# Patient Record
Sex: FEMALE | Race: WHITE | Employment: UNEMPLOYED | URBAN - METROPOLITAN AREA
[De-identification: names, ages, dates, MRNs, and addresses within clinical notes are randomized per-mention and may not be internally consistent; named-entity substitution may affect disease eponyms.]

---

## 2017-05-05 ENCOUNTER — TELEPHONE (OUTPATIENT)
Dept: FAMILY MEDICINE CLINIC | Facility: CLINIC | Age: 52
End: 2017-05-05

## 2017-05-05 DIAGNOSIS — Z12.39 BREAST CANCER SCREENING: Primary | ICD-10-CM

## 2017-05-05 NOTE — TELEPHONE ENCOUNTER
See below. Pt had breast MRI 3/2016, was done early I think though. Her u/s after was fine and said routine f/u was recommended. No time frame was mentioned. I am not sure what prompted her order for diagnostic mammo 11/29.  See notes from original [address] [place] [address] Loop

## 2017-05-05 NOTE — TELEPHONE ENCOUNTER
Pt called stating she needed a mammo order. She had her last mammo last year in March. She stated they found something, and was told to come back in a year. She has an order in from (11/29/16), but she would like to know if this is the correct one.  Please

## 2017-05-08 RX ORDER — NITROFURANTOIN 25; 75 MG/1; MG/1
CAPSULE ORAL
Qty: 30 CAPSULE | Refills: 0 | Status: SHIPPED | OUTPATIENT
Start: 2017-05-08 | End: 2019-04-08

## 2017-05-08 RX ORDER — NITROFURANTOIN 25; 75 MG/1; MG/1
CAPSULE ORAL
Qty: 90 CAPSULE | Refills: 0 | Status: SHIPPED | OUTPATIENT
Start: 2017-05-08 | End: 2018-01-08

## 2017-05-08 NOTE — TELEPHONE ENCOUNTER
Call to pt's cell reaches identified voice mail. Per hipaa consent, left vmm informing pako MEDRANO placed new requested order 5/5/17-can call [facility] scheduling to schedule. Advised to call back if any further questions.

## 2017-05-11 ENCOUNTER — HOSPITAL ENCOUNTER (OUTPATIENT)
Dept: MAMMOGRAPHY | Facility: HOSPITAL | Age: 52
Discharge: HOME | End: 2017-05-11
Attending: FAMILY MEDICINE
Payer: COMMERCIAL

## 2017-05-11 DIAGNOSIS — Z12.39 BREAST CANCER SCREENING: ICD-10-CM

## 2017-05-11 PROCEDURE — 77063 BREAST TOMOSYNTHESIS BI: CPT | Performed by: FAMILY MEDICINE

## 2017-05-11 PROCEDURE — 77067 SCR MAMMO BI INCL CAD: CPT | Performed by: FAMILY MEDICINE

## 2017-11-16 RX ORDER — DROSPIRENONE AND ETHINYL ESTRADIOL 0.03MG-3MG
KIT ORAL
Qty: 28 TABLET | Refills: 0 | Status: SHIPPED | OUTPATIENT
Start: 2017-11-16 | End: 2017-12-11

## 2017-12-11 RX ORDER — DROSPIRENONE AND ETHINYL ESTRADIOL 0.03MG-3MG
KIT ORAL
Qty: 28 TABLET | Refills: 0 | Status: SHIPPED | OUTPATIENT
Start: 2017-12-11 | End: 2018-01-08

## 2018-01-08 ENCOUNTER — OFFICE VISIT (OUTPATIENT)
Dept: FAMILY MEDICINE CLINIC | Facility: CLINIC | Age: 53
End: 2018-01-08

## 2018-01-08 VITALS
HEART RATE: 76 BPM | HEIGHT: 66 IN | BODY MASS INDEX: 24.27 KG/M2 | DIASTOLIC BLOOD PRESSURE: 80 MMHG | WEIGHT: 151 LBS | TEMPERATURE: 97 F | RESPIRATION RATE: 12 BRPM | SYSTOLIC BLOOD PRESSURE: 120 MMHG

## 2018-01-08 DIAGNOSIS — Z12.31 ENCOUNTER FOR SCREENING MAMMOGRAM FOR MALIGNANT NEOPLASM OF BREAST: ICD-10-CM

## 2018-01-08 DIAGNOSIS — Z13.89 SCREENING FOR GENITOURINARY CONDITION: ICD-10-CM

## 2018-01-08 DIAGNOSIS — Z11.3 SCREENING FOR STD (SEXUALLY TRANSMITTED DISEASE): ICD-10-CM

## 2018-01-08 DIAGNOSIS — Z12.11 ENCOUNTER FOR SCREENING COLONOSCOPY: ICD-10-CM

## 2018-01-08 DIAGNOSIS — Z11.51 SCREENING FOR HPV (HUMAN PAPILLOMAVIRUS): ICD-10-CM

## 2018-01-08 DIAGNOSIS — Z00.00 ROUTINE GENERAL MEDICAL EXAMINATION AT A HEALTH CARE FACILITY: Primary | ICD-10-CM

## 2018-01-08 DIAGNOSIS — N76.1 SUBACUTE VAGINITIS: ICD-10-CM

## 2018-01-08 DIAGNOSIS — Z00.00 BLOOD TESTS FOR ROUTINE GENERAL PHYSICAL EXAMINATION: ICD-10-CM

## 2018-01-08 DIAGNOSIS — Z13.820 ENCOUNTER FOR SCREENING FOR OSTEOPOROSIS: ICD-10-CM

## 2018-01-08 DIAGNOSIS — Z12.4 PAP SMEAR FOR CERVICAL CANCER SCREENING: ICD-10-CM

## 2018-01-08 PROCEDURE — 87660 TRICHOMONAS VAGIN DIR PROBE: CPT | Performed by: FAMILY MEDICINE

## 2018-01-08 PROCEDURE — 87510 GARDNER VAG DNA DIR PROBE: CPT | Performed by: FAMILY MEDICINE

## 2018-01-08 PROCEDURE — 87491 CHLMYD TRACH DNA AMP PROBE: CPT | Performed by: FAMILY MEDICINE

## 2018-01-08 PROCEDURE — 87624 HPV HI-RISK TYP POOLED RSLT: CPT | Performed by: FAMILY MEDICINE

## 2018-01-08 PROCEDURE — 88175 CYTOPATH C/V AUTO FLUID REDO: CPT | Performed by: FAMILY MEDICINE

## 2018-01-08 PROCEDURE — 87480 CANDIDA DNA DIR PROBE: CPT | Performed by: FAMILY MEDICINE

## 2018-01-08 PROCEDURE — 87591 N.GONORRHOEAE DNA AMP PROB: CPT | Performed by: FAMILY MEDICINE

## 2018-01-08 PROCEDURE — 99396 PREV VISIT EST AGE 40-64: CPT | Performed by: FAMILY MEDICINE

## 2018-01-08 RX ORDER — DROSPIRENONE AND ETHINYL ESTRADIOL 0.03MG-3MG
1 KIT ORAL
Qty: 3 PACKAGE | Refills: 3 | Status: SHIPPED | OUTPATIENT
Start: 2018-01-08 | End: 2018-11-20

## 2018-01-08 RX ORDER — AZITHROMYCIN 250 MG/1
TABLET, FILM COATED ORAL
Qty: 6 TABLET | Refills: 0 | Status: SHIPPED | OUTPATIENT
Start: 2018-01-08 | End: 2018-02-16 | Stop reason: ALTCHOICE

## 2018-01-08 NOTE — PROGRESS NOTES
CHIEF COMPLAINT       Annual Physical Exam  HPI:   Jayy Young is a 50year old female who presents for a complete physical exam.   Off and on problems with vaginal discharge. Sometimes with odor. Cough for about 2 weeks. Would like z pack.    Wt Marco Salm OR) Take  by mouth.  Disp:  Rfl:    fluconazole (DIFLUCAN) 150 MG Oral Tab 1 po once Disp: 1 tablet Rfl: 0      No Known Allergies   Past Medical History:   Diagnosis Date   • Anxiety state, unspecified    • Chest pain, unspecified 12/09   •  apparent distress  SKIN: no rashes,no suspicious lesions  HEENT: atraumatic, normocephalic,ears and throat are clear  EYES:PERRLA, EOMI, normal nondilated fundoscopic exam,conjunctiva are clear  NECK: supple,no adenopathy,no bruits  CHEST: no chest tendern

## 2018-01-09 LAB
C TRACH DNA SPEC QL NAA+PROBE: NEGATIVE
N GONORRHOEA DNA SPEC QL NAA+PROBE: NEGATIVE

## 2018-01-10 LAB — HPV I/H RISK 1 DNA SPEC QL NAA+PROBE: NEGATIVE

## 2018-01-11 RX ORDER — FLUCONAZOLE 150 MG/1
TABLET ORAL
Qty: 1 TABLET | Refills: 0 | Status: SHIPPED | OUTPATIENT
Start: 2018-01-11 | End: 2018-02-16 | Stop reason: ALTCHOICE

## 2018-01-11 RX ORDER — METRONIDAZOLE 500 MG/1
500 TABLET ORAL 2 TIMES DAILY
Qty: 14 TABLET | Refills: 0 | Status: SHIPPED | OUTPATIENT
Start: 2018-01-11 | End: 2018-02-16 | Stop reason: ALTCHOICE

## 2018-01-11 NOTE — TELEPHONE ENCOUNTER
Please see pended refill of Diflucan and Metronidazole and approve if appropriate. Per pt's request for Diflucan refill. She states that she will get yeast infection after the treatment and would like to have just in case. Thanks.

## 2018-01-15 ENCOUNTER — TELEPHONE (OUTPATIENT)
Dept: FAMILY MEDICINE CLINIC | Facility: CLINIC | Age: 53
End: 2018-01-15

## 2018-01-15 RX ORDER — CLINDAMYCIN PHOSPHATE 20 MG/G
CREAM VAGINAL
Qty: 40 G | Refills: 0 | Status: SHIPPED | OUTPATIENT
Start: 2018-01-15 | End: 2018-02-16 | Stop reason: ALTCHOICE

## 2018-01-15 NOTE — TELEPHONE ENCOUNTER
Stomach upset would be typical with the metronidazole. The other symptoms wouldn't be typical.  She only has 7 days of med. Would she like to continue it or sub something else?  Patients tell me the vaginal preparations for bacterial vaginosis are more exp

## 2018-01-15 NOTE — TELEPHONE ENCOUNTER
Pt calling she thinks she having a reaction to the meds for 1/11   metRONIDAZOLE 500 MG Oral Tab back pain, still bleeding from period and very heavy should be over by now and a headache only took 3 pills so far transfered to triage

## 2018-01-23 ENCOUNTER — TELEPHONE (OUTPATIENT)
Dept: FAMILY MEDICINE CLINIC | Facility: CLINIC | Age: 53
End: 2018-01-23

## 2018-01-23 DIAGNOSIS — Z00.00 BLOOD TESTS FOR ROUTINE GENERAL PHYSICAL EXAMINATION: Primary | ICD-10-CM

## 2018-01-23 DIAGNOSIS — Z13.89 SCREENING FOR GENITOURINARY CONDITION: ICD-10-CM

## 2018-01-23 NOTE — TELEPHONE ENCOUNTER
Patient states that her insurance will only pay for [facility].  Updated chart. Please send all labs to [facility].  Thank you.

## 2018-01-23 NOTE — TELEPHONE ENCOUNTER
Record shows ofc visit/cpx with pako MEDRANO with routine [facility] lab orders placed same date. Due to info noted below same lab orders redone for [facility] and placed.

## 2018-02-16 ENCOUNTER — OFFICE VISIT (OUTPATIENT)
Dept: FAMILY MEDICINE CLINIC | Facility: CLINIC | Age: 53
End: 2018-02-16

## 2018-02-16 ENCOUNTER — TELEPHONE (OUTPATIENT)
Dept: FAMILY MEDICINE CLINIC | Facility: CLINIC | Age: 53
End: 2018-02-16

## 2018-02-16 VITALS
TEMPERATURE: 98 F | WEIGHT: 149 LBS | DIASTOLIC BLOOD PRESSURE: 82 MMHG | BODY MASS INDEX: 24 KG/M2 | HEART RATE: 80 BPM | RESPIRATION RATE: 12 BRPM | SYSTOLIC BLOOD PRESSURE: 112 MMHG

## 2018-02-16 DIAGNOSIS — R31.9 HEMATURIA, UNSPECIFIED TYPE: ICD-10-CM

## 2018-02-16 DIAGNOSIS — M54.50 ACUTE LOW BACK PAIN WITHOUT SCIATICA, UNSPECIFIED BACK PAIN LATERALITY: Primary | ICD-10-CM

## 2018-02-16 LAB
APPEARANCE: CLEAR
MULTISTIX LOT#: ABNORMAL NUMERIC
PH, URINE: 6 (ref 4.5–8)
SPECIFIC GRAVITY: 1 (ref 1–1.03)
URINE-COLOR: YELLOW
UROBILINOGEN,SEMI-QN: 0.2 MG/DL (ref 0–1.9)

## 2018-02-16 PROCEDURE — 99213 OFFICE O/P EST LOW 20 MIN: CPT | Performed by: FAMILY MEDICINE

## 2018-02-16 PROCEDURE — 81003 URINALYSIS AUTO W/O SCOPE: CPT | Performed by: FAMILY MEDICINE

## 2018-02-16 NOTE — TELEPHONE ENCOUNTER
Patient has had severe back pain for one week. Pain comes and goes and radiates to kidneys. Patient is currently on antibiotics after intercourse for bladder infections.

## 2018-02-16 NOTE — TELEPHONE ENCOUNTER
REINA To pt. She was notified her appt for her Stat CT of the abdomen and pelvis kidney stone protocol is today at 4:00 PM. She is to go to the [facility] at [address] [facility]. Pt was advised to fast 2 hours prior. Nothing to eat or drink.  She is to ta

## 2018-02-16 NOTE — PROGRESS NOTES
Uyen Najera is a 50year old female. HPI:   The patient complains of a back discomfort which can radiate forward for the past 2 weeks. She states the pain can be fairly severe when it happens. She states the pain is intermittent.   She denies any spe glasses wine every other day       REVIEW OF SYSTEMS:   GENERAL HEALTH: feels well otherwise  SKIN: denies any unusual skin lesions or rashes  RESPIRATORY: denies shortness of breath with exertion  CARDIOVASCULAR: denies chest pain on exertion  GI: See HPI

## 2018-02-18 ENCOUNTER — TELEPHONE (OUTPATIENT)
Dept: FAMILY MEDICINE CLINIC | Facility: CLINIC | Age: 53
End: 2018-02-18

## 2018-02-19 NOTE — TELEPHONE ENCOUNTER
Called to pt. Pt stated that she did not get the Ct done due to the $1000 out of pocket cost of the test.  Pt stated that she wanted to know what Dr White Ours thought the pain was from. Advised pt that provider ordered test to  R/o kidney stones.   Pt voi

## 2018-02-19 NOTE — TELEPHONE ENCOUNTER
We had ordered a stat CT of the abdomen and pelvis to rule out kidney stone. The patient did not follow through on this. How is the patient feeling?

## 2018-02-20 NOTE — PROGRESS NOTES
Claudette Wheeler is a 50year old female. CHIEF COMPLAINT   Mood changes, questions about menopause  HPI:   Mood changes recently. More anxiety recently - wakes up with anxiety. Motivation decreased. Not as organized as she was before.   Doesn't want to Alcohol use: Yes           0.0 oz/week     Comment: occas/ couples glasses wine every other day       REVIEW OF SYSTEMS:   GENERAL HEALTH: as above  SKIN: denies any unusual skin lesions or rashes  RESPIRATORY: denies shortness of breath with exertion  C

## 2018-02-21 ENCOUNTER — TELEPHONE (OUTPATIENT)
Dept: FAMILY MEDICINE CLINIC | Facility: CLINIC | Age: 53
End: 2018-02-21

## 2018-02-21 NOTE — TELEPHONE ENCOUNTER
I don't prescribe abx without seeing patient and collecting urine cx first.  She needs to go to immediate care or [address] or be seen in office tomorrow.

## 2018-02-21 NOTE — TELEPHONE ENCOUNTER
Call to pt-confirms onset drung the night of pain with urinating. sts had urinary frequency for past 2 wks and at time she saw dr Chas Anders last wk but no pain or other symptoms at that time.   sts takes macrobid \"which seems to keep things at [facility] [address] most of

## 2018-02-21 NOTE — TELEPHONE ENCOUNTER
Call to pt-advised of pako MEDRANO comments/recommendations noted below. Pt insists she can not and will not go to [facility] or lab for further testing. \"only want abx ordered\"  Advised will forward info to dr Mansoor Canchola now.  Will call back either with recomme

## 2018-02-21 NOTE — TELEPHONE ENCOUNTER
Dicussed by phone with dr Shana Lacey comments/recommendations are as follows:    \"Pt seen in ofc 2/16/18 reporting 2 wk hx of back discomfort radiating forward, hematuria and known hx of frequent UTI's.    Pt did not follow through with orders for stat

## 2018-02-21 NOTE — TELEPHONE ENCOUNTER
Dr. Annemarie Fabry saw last week (I saw her for unrelated issue). I would advise being seen at [facility] (won't have to take off work since they are open later) so that they can check urine and do a urine culture.   If she declines, check urinalysis and urine

## 2018-02-21 NOTE — TELEPHONE ENCOUNTER
Pt calling she was in yesterday for different appt but was here Friday with Dr. Ramesh Frias and did a urine sample and was ok'ed, however today she is having a lot of pain when using the bathroom.  She would like some antibiotics she does take a med after intercourse

## 2018-02-22 RX ORDER — CIPROFLOXACIN 250 MG/1
TABLET, FILM COATED ORAL
Qty: 10 TABLET | Refills: 0 | OUTPATIENT
Start: 2018-02-22 | End: 2018-03-20 | Stop reason: ALTCHOICE

## 2018-03-02 ENCOUNTER — HOSPITAL ENCOUNTER (OUTPATIENT)
Dept: CT IMAGING | Facility: HOSPITAL | Age: 53
Discharge: HOME | End: 2018-03-02
Attending: FAMILY MEDICINE
Payer: COMMERCIAL

## 2018-03-02 DIAGNOSIS — R79.9 ABNORMAL BLOOD CHEMISTRY: Primary | ICD-10-CM

## 2018-03-02 DIAGNOSIS — R31.9 HEMATURIA, UNSPECIFIED TYPE: ICD-10-CM

## 2018-03-02 DIAGNOSIS — M54.50 ACUTE LOW BACK PAIN WITHOUT SCIATICA, UNSPECIFIED BACK PAIN LATERALITY: ICD-10-CM

## 2018-03-02 LAB
ABSOLUTE BASOPHILS: 65 CELLS/UL (ref 0–200)
ABSOLUTE EOSINOPHILS: 283 CELLS/UL (ref 15–500)
ABSOLUTE LYMPHOCYTES: 2322 CELLS/UL (ref 850–3900)
ABSOLUTE MONOCYTES: 512 CELLS/UL (ref 200–950)
ABSOLUTE NEUTROPHILS: 7717 CELLS/UL (ref 1500–7800)
ALBUMIN/GLOBULIN RATIO: 1.5 (CALC) (ref 1–2.5)
ALBUMIN: 4.2 G/DL (ref 3.6–5.1)
ALKALINE PHOSPHATASE: 32 U/L (ref 33–130)
ALT: 10 U/L (ref 6–29)
AST: 15 U/L (ref 10–35)
BASOPHILS: 0.6 %
BILIRUBIN, TOTAL: 0.6 MG/DL (ref 0.2–1.2)
BILIRUBIN: NEGATIVE
BUN: 14 MG/DL (ref 7–25)
CALCIUM: 9.2 MG/DL (ref 8.6–10.4)
CARBON DIOXIDE: 23 MMOL/L (ref 20–31)
CHLORIDE: 106 MMOL/L (ref 98–110)
CHOL/HDLC RATIO: 2.9 (CALC)
CHOLESTEROL, TOTAL: 172 MG/DL
COLOR: YELLOW
CREATININE: 0.86 MG/DL (ref 0.5–1.05)
EGFR IF AFRICN AM: 91 ML/MIN/1.73M2
EGFR IF NONAFRICN AM: 79 ML/MIN/1.73M2
EOSINOPHILS: 2.6 %
GLOBULIN: 2.8 G/DL (CALC) (ref 1.9–3.7)
GLUCOSE: 100 MG/DL (ref 65–99)
GLUCOSE: NEGATIVE
HDL CHOLESTEROL: 60 MG/DL
HEMATOCRIT: 40 % (ref 35–45)
HEMOGLOBIN: 13.6 G/DL (ref 11.7–15.5)
KETONES: NEGATIVE
LDL-CHOLESTEROL: 88 MG/DL (CALC)
LYMPHOCYTES: 21.3 %
MCH: 32.9 PG (ref 27–33)
MCHC: 34 G/DL (ref 32–36)
MCV: 96.9 FL (ref 80–100)
MONOCYTES: 4.7 %
MPV: 11.9 FL (ref 7.5–12.5)
NEUTROPHILS: 70.8 %
NITRITE: NEGATIVE
NON-HDL CHOLESTEROL: 112 MG/DL (CALC)
PH: 6 (ref 5–8)
PLATELET COUNT: 322 THOUSAND/UL (ref 140–400)
POTASSIUM: 4.4 MMOL/L (ref 3.5–5.3)
PROTEIN, TOTAL: 7 G/DL (ref 6.1–8.1)
PROTEIN: NEGATIVE
RDW: 11.1 % (ref 11–15)
RED BLOOD CELL COUNT: 4.13 MILLION/UL (ref 3.8–5.1)
SODIUM: 138 MMOL/L (ref 135–146)
SPECIFIC GRAVITY: 1.02 (ref 1–1.03)
TRIGLYCERIDES: 143 MG/DL
TSH: 1.76 MIU/L
VITAMIN D, 25-OH, TOTAL: 58 NG/ML (ref 30–100)
WHITE BLOOD CELL COUNT: 10.9 THOUSAND/UL (ref 3.8–10.8)

## 2018-03-02 PROCEDURE — 74176 CT ABD & PELVIS W/O CONTRAST: CPT | Performed by: FAMILY MEDICINE

## 2018-03-10 LAB
ESTRADIOL: 37 PG/ML
FSH: 32.3 MIU/ML
LH: 7.4 MIU/ML

## 2018-03-20 ENCOUNTER — OFFICE VISIT (OUTPATIENT)
Dept: FAMILY MEDICINE CLINIC | Facility: CLINIC | Age: 53
End: 2018-03-20

## 2018-03-20 VITALS
HEART RATE: 72 BPM | RESPIRATION RATE: 16 BRPM | DIASTOLIC BLOOD PRESSURE: 80 MMHG | SYSTOLIC BLOOD PRESSURE: 120 MMHG | TEMPERATURE: 98 F | HEIGHT: 66 IN | BODY MASS INDEX: 23.63 KG/M2 | WEIGHT: 147 LBS

## 2018-03-20 DIAGNOSIS — R45.86 MOOD CHANGES: ICD-10-CM

## 2018-03-20 DIAGNOSIS — N95.1 PERIMENOPAUSE: Primary | ICD-10-CM

## 2018-03-20 PROCEDURE — 99213 OFFICE O/P EST LOW 20 MIN: CPT | Performed by: FAMILY MEDICINE

## 2018-03-20 RX ORDER — FLUOXETINE 10 MG/1
10 CAPSULE ORAL DAILY
Qty: 90 CAPSULE | Refills: 1 | Status: SHIPPED | OUTPATIENT
Start: 2018-03-20 | End: 2018-11-05

## 2018-03-20 NOTE — PROGRESS NOTES
Kyleigh Parker is a 50year old female. CHIEF COMPLAINT   Follow up on prozac  HPI:   Feeling much better with prozac. 10mg daily. Energy levels better. Anxiety improved. Helped with mood swings and crying for no reason. Had cystoscopy with urology. History:  Smoking status: Never Smoker                                                              Smokeless tobacco: Never Used                      Alcohol use: Yes           0.0 oz/week     Comment: occas/ couples glasses wine every other day       REV

## 2018-05-18 RX ORDER — FLUOXETINE 10 MG/1
CAPSULE ORAL
Qty: 90 CAPSULE | Refills: 0 | OUTPATIENT
Start: 2018-05-18

## 2018-06-20 ENCOUNTER — OFFICE VISIT (OUTPATIENT)
Dept: FAMILY MEDICINE CLINIC | Facility: CLINIC | Age: 53
End: 2018-06-20

## 2018-06-20 VITALS
HEART RATE: 81 BPM | SYSTOLIC BLOOD PRESSURE: 98 MMHG | WEIGHT: 147 LBS | HEIGHT: 66 IN | TEMPERATURE: 98 F | BODY MASS INDEX: 23.63 KG/M2 | RESPIRATION RATE: 16 BRPM | DIASTOLIC BLOOD PRESSURE: 70 MMHG | OXYGEN SATURATION: 99 %

## 2018-06-20 DIAGNOSIS — R05.9 COUGH: ICD-10-CM

## 2018-06-20 DIAGNOSIS — J02.9 SORE THROAT: ICD-10-CM

## 2018-06-20 DIAGNOSIS — J20.9 ACUTE BRONCHITIS, UNSPECIFIED ORGANISM: Primary | ICD-10-CM

## 2018-06-20 PROCEDURE — 87880 STREP A ASSAY W/OPTIC: CPT | Performed by: NURSE PRACTITIONER

## 2018-06-20 PROCEDURE — 99214 OFFICE O/P EST MOD 30 MIN: CPT | Performed by: NURSE PRACTITIONER

## 2018-06-20 PROCEDURE — 94640 AIRWAY INHALATION TREATMENT: CPT | Performed by: NURSE PRACTITIONER

## 2018-06-20 RX ORDER — AZITHROMYCIN 250 MG/1
TABLET, FILM COATED ORAL
Qty: 6 TABLET | Refills: 0 | Status: SHIPPED | OUTPATIENT
Start: 2018-06-20 | End: 2019-04-08 | Stop reason: ALTCHOICE

## 2018-06-20 RX ORDER — METHYLPREDNISOLONE 4 MG/1
TABLET ORAL
Qty: 1 KIT | Refills: 0 | Status: SHIPPED | OUTPATIENT
Start: 2018-06-20 | End: 2019-04-08 | Stop reason: ALTCHOICE

## 2018-06-20 RX ORDER — ALBUTEROL SULFATE 2.5 MG/3ML
2.5 SOLUTION RESPIRATORY (INHALATION) ONCE
Status: COMPLETED | OUTPATIENT
Start: 2018-06-20 | End: 2018-06-20

## 2018-06-20 RX ORDER — BENZONATATE 100 MG/1
100 CAPSULE ORAL 3 TIMES DAILY PRN
Qty: 12 CAPSULE | Refills: 0 | Status: SHIPPED | OUTPATIENT
Start: 2018-06-20 | End: 2019-04-08 | Stop reason: ALTCHOICE

## 2018-06-20 RX ADMIN — ALBUTEROL SULFATE 2.5 MG: 2.5 SOLUTION RESPIRATORY (INHALATION) at 11:12:00

## 2018-06-20 NOTE — PROGRESS NOTES
Argelia Chang is a 50year old female. HPI:   Patient presents today reporting wheezing, cough and a scratchy throat for the past 2 days. She reports that the cough is dry but occasionally will cough up some clear phlegm.  She reports the cough is worse counseling for prescription of oral contraceptives    • Microscopic hematuria    • Migraine, unspecified, without mention of intractable migraine without mention of status migrainosus    • Other B-complex deficiencies    • Other screening mammogram    • Pe Disp Refills    azithromycin 250 MG Oral Tab 6 tablet 0      Sig: Take two tablets by mouth today, then one daily. methylPREDNISolone (MEDROL) 4 MG Oral Tablet Therapy Pack 1 kit 0      Sig: As directed.       benzonatate 100 MG Oral Cap 12 capsule 0 The patient is asked to call with an update in 2 days-sooner if any new or worsening symptoms. Patient voiced understanding and is agreeable to the plan of care.

## 2018-11-09 RX ORDER — FLUOXETINE 10 MG/1
CAPSULE ORAL
Qty: 30 CAPSULE | Refills: 0 | Status: SHIPPED | OUTPATIENT
Start: 2018-11-09 | End: 2019-04-08

## 2018-11-09 NOTE — TELEPHONE ENCOUNTER
Our protocol states that she needs to be seen every 6 months for this medication. I have refilled #30. She needs to schedule appointment for further refills.

## 2018-11-21 RX ORDER — DROSPIRENONE AND ETHINYL ESTRADIOL 0.03MG-3MG
KIT ORAL
Qty: 84 TABLET | Refills: 0 | Status: SHIPPED | OUTPATIENT
Start: 2018-11-21 | End: 2019-02-06

## 2019-02-07 RX ORDER — DROSPIRENONE AND ETHINYL ESTRADIOL 0.03MG-3MG
KIT ORAL
Qty: 84 TABLET | Refills: 0 | Status: SHIPPED | OUTPATIENT
Start: 2019-02-07 | End: 2019-02-27

## 2019-02-21 ENCOUNTER — PATIENT OUTREACH (OUTPATIENT)
Dept: FAMILY MEDICINE CLINIC | Facility: CLINIC | Age: 54
End: 2019-02-21

## 2019-02-21 NOTE — PROGRESS NOTES
Please call pt to inquire if pt has had a colonoscopy in the last 10 years and if so who performed it (name and phone #). Please let Bridger Staples know so I can obtain the report.     If pt did not have a colonoscopy please advise them we will leave the FIT stoo

## 2019-02-27 DIAGNOSIS — Z12.39 BREAST CANCER SCREENING: Primary | ICD-10-CM

## 2019-03-01 NOTE — TELEPHONE ENCOUNTER
Pt scheduled a physical rather than a med check and hope that's okay. If not please advise. Pt is leaving for 2 weeks to go to [facility] so she needs the [facility] to hold her over until she comes back in April. Pt scheduled a physical with S Dressler on 4/8/19.

## 2019-03-02 RX ORDER — DROSPIRENONE AND ETHINYL ESTRADIOL 0.03MG-3MG
1 KIT ORAL
Qty: 84 TABLET | Refills: 0 | Status: SHIPPED | OUTPATIENT
Start: 2019-03-02 | End: 2019-04-08

## 2019-03-02 NOTE — TELEPHONE ENCOUNTER
I filled her OCP for 3 months. Please let her know that she will need to schedule her mammogram before due for next refill or will will be unable to refill again after this until mammogram is done. She is overdue for mammogram. I have placed the order.

## 2019-04-08 ENCOUNTER — OFFICE VISIT (OUTPATIENT)
Dept: FAMILY MEDICINE CLINIC | Facility: CLINIC | Age: 54
End: 2019-04-08
Payer: COMMERCIAL

## 2019-04-08 VITALS
HEART RATE: 72 BPM | HEIGHT: 66 IN | DIASTOLIC BLOOD PRESSURE: 70 MMHG | BODY MASS INDEX: 26.2 KG/M2 | WEIGHT: 163 LBS | RESPIRATION RATE: 16 BRPM | TEMPERATURE: 98 F | SYSTOLIC BLOOD PRESSURE: 110 MMHG

## 2019-04-08 DIAGNOSIS — K82.4 GALLBLADDER POLYP: ICD-10-CM

## 2019-04-08 DIAGNOSIS — Z00.00 BLOOD TESTS FOR ROUTINE GENERAL PHYSICAL EXAMINATION: ICD-10-CM

## 2019-04-08 DIAGNOSIS — K21.9 GASTROESOPHAGEAL REFLUX DISEASE, ESOPHAGITIS PRESENCE NOT SPECIFIED: ICD-10-CM

## 2019-04-08 DIAGNOSIS — R45.86 MOOD CHANGES: ICD-10-CM

## 2019-04-08 DIAGNOSIS — Z12.4 PAP SMEAR FOR CERVICAL CANCER SCREENING: ICD-10-CM

## 2019-04-08 DIAGNOSIS — Z11.51 SCREENING FOR HPV (HUMAN PAPILLOMAVIRUS): ICD-10-CM

## 2019-04-08 DIAGNOSIS — Z12.11 SCREENING FOR COLORECTAL CANCER: ICD-10-CM

## 2019-04-08 DIAGNOSIS — N95.1 PERIMENOPAUSE: ICD-10-CM

## 2019-04-08 DIAGNOSIS — Z12.12 SCREENING FOR COLORECTAL CANCER: ICD-10-CM
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
## 2019-04-08 DIAGNOSIS — Z86.39 HISTORY OF NON ANEMIC VITAMIN B12 DEFICIENCY: ICD-10-CM

## 2019-04-08 DIAGNOSIS — Z13.89 SCREENING FOR GENITOURINARY CONDITION: ICD-10-CM

## 2019-04-08 DIAGNOSIS — Z00.00 ROUTINE GENERAL MEDICAL EXAMINATION AT A HEALTH CARE FACILITY: Primary | ICD-10-CM

## 2019-04-08 PROCEDURE — 99213 OFFICE O/P EST LOW 20 MIN: CPT | Performed by: FAMILY MEDICINE

## 2019-04-08 PROCEDURE — 87624 HPV HI-RISK TYP POOLED RSLT: CPT | Performed by: FAMILY MEDICINE

## 2019-04-08 PROCEDURE — 99396 PREV VISIT EST AGE 40-64: CPT | Performed by: FAMILY MEDICINE

## 2019-04-08 RX ORDER — DROSPIRENONE AND ETHINYL ESTRADIOL 0.03MG-3MG
1 KIT ORAL DAILY
Qty: 3 PACKAGE | Refills: 3 | Status: SHIPPED | OUTPATIENT
Start: 2019-04-08 | End: 2019-09-03

## 2019-04-08 RX ORDER — FLUCONAZOLE 150 MG/1
TABLET ORAL
Qty: 2 TABLET | Refills: 2 | Status: SHIPPED | OUTPATIENT
Start: 2019-04-08 | End: 2019-11-01

## 2019-04-08 RX ORDER — FLUOXETINE 10 MG/1
CAPSULE ORAL
Qty: 90 CAPSULE | Refills: 1 | Status: SHIPPED | OUTPATIENT
Start: 2019-04-08 | End: 2019-10-05

## 2019-04-08 RX ORDER — NITROFURANTOIN 25; 75 MG/1; MG/1
CAPSULE ORAL
Qty: 90 CAPSULE | Refills: 0 | Status: SHIPPED | OUTPATIENT
Start: 2019-04-08 | End: 2021-07-19

## 2019-04-08 NOTE — PATIENT INSTRUCTIONS
Call [phone] to schedule mammogram.      Schedule appointment with the gastroenterologist for evaluation of heartburn and to discuss colonoscopy screening.       Try Pepcid 20mg once daily while awaiting the appointment with the gastroenterologist.

## 2019-04-08 NOTE — PROGRESS NOTES
CHIEF COMPLAINT       Annual Physical Exam  HPI:   Jayy Young is a 48year old female who presents for a complete physical exam.   History of vitamin B12 deficiency. Patient reports she was on injections previously. Would like a level tested again. capsule Rfl: 0   NITROFURANTOIN MONOHYD MACRO 100 MG Oral Cap TAKE ONE CAPSULE BY MOUTH AFTER INTERCOURSE AS NEEDED Disp: 30 capsule Rfl: 0   Betamethasone Dipropionate Aug (DIPROLENE AF) 0.05 % External Cream Apply BID. Max 2 weeks.  Disp: 30 g Rfl: 0   Zo vision or double vision  HEENT: no complaint of nasal congestion, sinus pain or ST  LUNGS: no complaint of shortness of breath with exertion  CARDIOVASCULAR: no complaint of chest pain on exertion  GI: as above  : as above  MUSCULOSKELETAL: no complaint physical examination  History of non anemic vitamin b12 deficiency  Pap smear for cervical cancer screening  Screening for hpv (human papillomavirus)  Screening for genitourinary condition  Cat bite, initial encounter  Gallbladder polyp  Mood changes: stab mammogram.

## 2019-05-18 RX ORDER — CIPROFLOXACIN 500 MG/1
500 TABLET, FILM COATED ORAL 2 TIMES DAILY
Qty: 10 TABLET | Refills: 0 | Status: SHIPPED | OUTPATIENT
Start: 2019-05-18 | End: 2019-05-23

## 2019-05-20 DIAGNOSIS — R39.9 UTI SYMPTOMS: Primary | ICD-10-CM

## 2019-05-27 NOTE — TELEPHONE ENCOUNTER
Please call patient to remind her to schedule mammogram.  [facility] to refill current script but advise her that after this, we won't be able to continue to fill without a current mammogram

## 2019-05-30 RX ORDER — DROSPIRENONE AND ETHINYL ESTRADIOL 0.03MG-3MG
KIT ORAL
Qty: 84 TABLET | Refills: 0 | Status: SHIPPED | OUTPATIENT
Start: 2019-05-30 | End: 2019-09-03

## 2019-05-30 NOTE — TELEPHONE ENCOUNTER
LVM on identified mail box. Patient advised to schedule appointment to have mammogram done. Call back with any question or concerns.

## 2019-07-09 ENCOUNTER — MED REC SCAN ONLY (OUTPATIENT)
Dept: FAMILY MEDICINE CLINIC | Facility: CLINIC | Age: 54
End: 2019-07-09

## 2019-07-09 ENCOUNTER — HOSPITAL ENCOUNTER (OUTPATIENT)
Dept: MAMMOGRAPHY | Facility: HOSPITAL | Age: 54
Discharge: HOME | End: 2019-07-09
Attending: FAMILY MEDICINE
Payer: COMMERCIAL

## 2019-07-09 DIAGNOSIS — Z12.39 BREAST CANCER SCREENING: ICD-10-CM

## 2019-07-09 PROCEDURE — 77067 SCR MAMMO BI INCL CAD: CPT | Performed by: FAMILY MEDICINE

## 2019-07-09 PROCEDURE — 77063 BREAST TOMOSYNTHESIS BI: CPT | Performed by: FAMILY MEDICINE

## 2019-07-29 ENCOUNTER — TELEPHONE (OUTPATIENT)
Dept: FAMILY MEDICINE CLINIC | Facility: CLINIC | Age: 54
End: 2019-07-29

## 2019-07-29 NOTE — TELEPHONE ENCOUNTER
Pt would like to try a different medication for her headaches. Please advise. Thank you. She did her endoscopy and colonoscopy and hey told her to talk to her primary. Please advise. Thank you.

## 2019-07-29 NOTE — TELEPHONE ENCOUNTER
Fernando for pt to CB. Please ask her why she is wanting to change her medication for her headaches. Is it not working any more, is she having an adverse reaction?

## 2019-07-29 NOTE — TELEPHONE ENCOUNTER
Pt called back. She is taking Aspirin-butalbital-caffeine for her headaches -40 mg . It is not working. She tried to increase the dose but it caused an increase In stomach upset.  GI advised her to call her PCP and see if they can prescribe something

## 2019-07-30 RX ORDER — SUMATRIPTAN 100 MG/1
TABLET, FILM COATED ORAL
Qty: 9 TABLET | Refills: 0 | Status: CANCELLED | OUTPATIENT
Start: 2019-07-30

## 2019-07-30 RX ORDER — ZOLMITRIPTAN 5 MG/1
SPRAY NASAL
Qty: 6 EACH | Refills: 1 | Status: SHIPPED | OUTPATIENT
Start: 2019-07-30 | End: 2019-11-10

## 2019-07-30 NOTE — TELEPHONE ENCOUNTER
Call to pt daylin MEDRANO question noted below. Initially pt requested both. Advised insurance will usually only cover one of the same med. Pt sts \"will leave that manju to pako-whichever she feels is most effective. \" pt confirms pharmacy in record.

## 2019-07-30 NOTE — TELEPHONE ENCOUNTER
Per pt she tried Imitrex before  Did not work either  Mirant my h/a worse\"    Willing to try Zomig     Pls advise  Thank you

## 2019-08-13 ENCOUNTER — WALK IN (OUTPATIENT)
Dept: URGENT CARE | Age: 54
End: 2019-08-13

## 2019-08-13 ENCOUNTER — TELEPHONE (OUTPATIENT)
Dept: FAMILY MEDICINE CLINIC | Facility: CLINIC | Age: 54
End: 2019-08-13

## 2019-08-13 VITALS
RESPIRATION RATE: 14 BRPM | HEART RATE: 78 BPM | DIASTOLIC BLOOD PRESSURE: 60 MMHG | SYSTOLIC BLOOD PRESSURE: 102 MMHG | HEIGHT: 66 IN | BODY MASS INDEX: 25.71 KG/M2 | TEMPERATURE: 98.3 F | WEIGHT: 160 LBS

## 2019-08-13 DIAGNOSIS — M54.5 ACUTE RIGHT-SIDED LOW BACK PAIN, WITH SCIATICA PRESENCE UNSPECIFIED: Primary | ICD-10-CM

## 2019-08-13 PROCEDURE — 99203 OFFICE O/P NEW LOW 30 MIN: CPT | Performed by: NURSE PRACTITIONER

## 2019-08-13 RX ORDER — NAPROXEN 500 MG/1
500 TABLET ORAL 2 TIMES DAILY
Qty: 60 TABLET | Refills: 5 | Status: SHIPPED | OUTPATIENT
Start: 2019-08-13 | End: 2021-11-10 | Stop reason: ALTCHOICE

## 2019-08-13 RX ORDER — CYCLOBENZAPRINE HCL 10 MG
10 TABLET ORAL 3 TIMES DAILY PRN
Qty: 30 TABLET | Refills: 0 | Status: SHIPPED | OUTPATIENT
Start: 2019-08-13 | End: 2023-02-10 | Stop reason: ALTCHOICE

## 2019-08-13 ASSESSMENT — ENCOUNTER SYMPTOMS
EYES NEGATIVE: 1
GASTROINTESTINAL NEGATIVE: 1
BACK PAIN: 1
CONSTITUTIONAL NEGATIVE: 1
NEUROLOGICAL NEGATIVE: 1
PSYCHIATRIC NEGATIVE: 1

## 2019-08-13 NOTE — TELEPHONE ENCOUNTER
Incoming call from patient, reports she has severe back pain, she is unable to bend or turn, or get out of bed without severe pain, she injured  patient  back while working out last Thursday(she was holding a weight, bending over, and holding right leg up

## 2019-08-13 NOTE — TELEPHONE ENCOUNTER
Information given to patient, voiced understanding, declined to schedule an appointment, states she cant get out of bed now, she doesn't want to drive an hour to appointment.   Advised to call back to schedule or go to walk in clinic, voiced understanding,

## 2019-08-13 NOTE — TELEPHONE ENCOUNTER
1. What are your symptoms? Severe back pain can not turn bend or get out of bed    2. How long have you been having these symptoms? Since last Thursday getting worse    3. Have you done anything already to treat your symptoms?          ADDITIONAL INFO

## 2019-09-03 RX ORDER — DROSPIRENONE AND ETHINYL ESTRADIOL 0.03MG-3MG
KIT ORAL
Qty: 84 TABLET | Refills: 2 | Status: SHIPPED | OUTPATIENT
Start: 2019-09-03 | End: 2020-05-19

## 2019-09-16 ENCOUNTER — MED REC SCAN ONLY (OUTPATIENT)
Dept: FAMILY MEDICINE CLINIC | Facility: CLINIC | Age: 54
End: 2019-09-16

## 2019-10-06 RX ORDER — FLUOXETINE 10 MG/1
CAPSULE ORAL
Qty: 90 CAPSULE | Refills: 0 | Status: SHIPPED | OUTPATIENT
Start: 2019-10-06 | End: 2019-10-10

## 2019-10-10 NOTE — TELEPHONE ENCOUNTER
Pt states she doesn't take this medication anymore,  therefore she doesn't think a med check is necessary.

## 2019-10-10 NOTE — TELEPHONE ENCOUNTER
Pharmacy asked for a refill which is why we called her to follow-up. I have discontinued from her list since she states she is no longer taking.

## 2019-10-28 ENCOUNTER — TELEPHONE (OUTPATIENT)
Dept: SCHEDULING | Age: 54
End: 2019-10-28

## 2019-10-28 ENCOUNTER — WALK IN (OUTPATIENT)
Dept: URGENT CARE | Age: 54
End: 2019-10-28

## 2019-10-28 VITALS
SYSTOLIC BLOOD PRESSURE: 120 MMHG | DIASTOLIC BLOOD PRESSURE: 80 MMHG | WEIGHT: 165.6 LBS | RESPIRATION RATE: 12 BRPM | BODY MASS INDEX: 26.61 KG/M2 | TEMPERATURE: 98.4 F | HEIGHT: 66 IN | HEART RATE: 72 BPM
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
## 2019-10-28 DIAGNOSIS — T30.0 BURN BY HOT LIQUID: Primary | ICD-10-CM

## 2019-10-28 PROCEDURE — 99203 OFFICE O/P NEW LOW 30 MIN: CPT | Performed by: NURSE PRACTITIONER

## 2019-10-28 SDOH — HEALTH STABILITY: MENTAL HEALTH: HOW OFTEN DO YOU HAVE A DRINK CONTAINING ALCOHOL?: NEVER

## 2019-10-28 ASSESSMENT — ENCOUNTER SYMPTOMS
CONSTITUTIONAL NEGATIVE: 1
NEUROLOGICAL NEGATIVE: 1
WOUND: 1
COLOR CHANGE: 1
PSYCHIATRIC NEGATIVE: 1
EYES NEGATIVE: 1
GASTROINTESTINAL NEGATIVE: 1

## 2019-11-01 ENCOUNTER — OFFICE VISIT (OUTPATIENT)
Dept: FAMILY MEDICINE CLINIC | Facility: CLINIC | Age: 54
End: 2019-11-01
Payer: COMMERCIAL

## 2019-11-01 VITALS
HEIGHT: 66 IN | DIASTOLIC BLOOD PRESSURE: 80 MMHG | HEART RATE: 90 BPM | BODY MASS INDEX: 25.71 KG/M2 | WEIGHT: 160 LBS | SYSTOLIC BLOOD PRESSURE: 120 MMHG | RESPIRATION RATE: 20 BRPM

## 2019-11-01 DIAGNOSIS — T22.211D PARTIAL THICKNESS BURN OF RIGHT FOREARM, SUBSEQUENT ENCOUNTER: Primary | ICD-10-CM

## 2019-11-01 PROCEDURE — 99213 OFFICE O/P EST LOW 20 MIN: CPT | Performed by: FAMILY MEDICINE

## 2019-11-01 RX ORDER — FLUCONAZOLE 150 MG/1
TABLET ORAL
Qty: 2 TABLET | Refills: 2 | Status: SHIPPED | OUTPATIENT
Start: 2019-11-01 | End: 2020-05-21
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
## 2019-11-01 NOTE — PATIENT INSTRUCTIONS
Wash twice daily with antibacterial soap. Then apply bacitracin. Then cover lightly with gauze. See me in 10 days for follow up. Watch for signs of infection.

## 2019-11-11 RX ORDER — ZOLMITRIPTAN 5 MG/1
SPRAY NASAL
Qty: 6 ML | Refills: 0 | Status: SHIPPED | OUTPATIENT
Start: 2019-11-11 | End: 2019-12-16

## 2019-11-12 ENCOUNTER — OFFICE VISIT (OUTPATIENT)
Dept: FAMILY MEDICINE CLINIC | Facility: CLINIC | Age: 54
End: 2019-11-12
Payer: COMMERCIAL

## 2019-11-12 VITALS
WEIGHT: 160 LBS | HEIGHT: 66 IN | TEMPERATURE: 97 F | BODY MASS INDEX: 25.71 KG/M2 | SYSTOLIC BLOOD PRESSURE: 114 MMHG | RESPIRATION RATE: 12 BRPM | HEART RATE: 60 BPM | DIASTOLIC BLOOD PRESSURE: 80 MMHG

## 2019-11-12 DIAGNOSIS — T30.0 BURN: Primary | ICD-10-CM

## 2019-11-12 PROCEDURE — 99213 OFFICE O/P EST LOW 20 MIN: CPT | Performed by: FAMILY MEDICINE

## 2019-11-12 RX ORDER — CLINDAMYCIN PHOSPHATE 20 MG/G
CREAM VAGINAL
Qty: 40 G | Refills: 1 | Status: SHIPPED | OUTPATIENT
Start: 2019-11-12 | End: 2020-02-28
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
## 2019-12-16 RX ORDER — ZOLMITRIPTAN 5 MG/1
SPRAY NASAL
Qty: 6 EACH | Refills: 0 | Status: SHIPPED | OUTPATIENT
Start: 2019-12-16 | End: 2020-02-28

## 2019-12-30 RX ORDER — FLUOXETINE 10 MG/1
CAPSULE ORAL
Qty: 90 CAPSULE | Refills: 0 | OUTPATIENT
Start: 2019-12-30

## 2020-02-28 RX ORDER — CLINDAMYCIN PHOSPHATE 20 MG/G
CREAM VAGINAL
Qty: 40 G | Refills: 1 | Status: SHIPPED | OUTPATIENT
Start: 2020-02-28 | End: 2020-05-19

## 2020-02-28 RX ORDER — ZOLMITRIPTAN 5 MG/1
SPRAY NASAL
Qty: 6 EACH | Refills: 0 | Status: SHIPPED | OUTPATIENT
Start: 2020-02-28 | End: 2020-03-24

## 2020-03-02 ENCOUNTER — TELEPHONE (OUTPATIENT)
Dept: FAMILY MEDICINE CLINIC | Facility: CLINIC | Age: 55
End: 2020-03-02

## 2020-03-02 NOTE — TELEPHONE ENCOUNTER
Medical Records Request received from Bette Guo for records from 10/25/2019 to present. Records to be sent to:   Bette Guo  [address] [facility]  [place] [address]    OR     [id number]  [facility]    Release sent to [employer] on

## 2020-03-18 ENCOUNTER — TELEPHONE (OUTPATIENT)
Dept: SCHEDULING | Age: 55
End: 2020-03-18

## 2020-03-25 RX ORDER — ZOLMITRIPTAN 5 MG/1
SPRAY NASAL
Qty: 6 EACH | Refills: 0 | Status: SHIPPED | OUTPATIENT
Start: 2020-03-25 | End: 2020-05-19

## 2020-03-25 NOTE — TELEPHONE ENCOUNTER
LOV:11/12/19 acute visit    Zolmitriptan nasal spray  Last refill: 2/28/2020 6 each    Please refill if appropriate. Thank you.

## 2020-05-19 RX ORDER — CLINDAMYCIN PHOSPHATE 20 MG/G
CREAM VAGINAL
Qty: 40 G | Refills: 1 | Status: SHIPPED | OUTPATIENT
Start: 2020-05-19 | End: 2021-07-19

## 2020-05-19 RX ORDER — DROSPIRENONE AND ETHINYL ESTRADIOL 0.03MG-3MG
1 KIT ORAL DAILY
Qty: 84 TABLET | Refills: 0 | Status: SHIPPED | OUTPATIENT
Start: 2020-05-19 | End: 2020-08-15

## 2020-05-19 RX ORDER — ZOLMITRIPTAN 5 MG/1
SPRAY NASAL
Qty: 6 EACH | Refills: 0 | Status: SHIPPED | OUTPATIENT
Start: 2020-05-19 | End: 2020-08-13

## 2020-05-21 ENCOUNTER — TELEPHONE (OUTPATIENT)
Dept: FAMILY MEDICINE CLINIC | Facility: CLINIC | Age: 55
End: 2020-05-21

## 2020-05-21 ENCOUNTER — VIRTUAL PHONE E/M (OUTPATIENT)
Dept: FAMILY MEDICINE CLINIC | Facility: CLINIC | Age: 55
End: 2020-05-21
Payer: COMMERCIAL

## 2020-05-21 DIAGNOSIS — J01.80 ACUTE NON-RECURRENT SINUSITIS OF OTHER SINUS: Primary | ICD-10-CM

## 2020-05-21 DIAGNOSIS — Z79.899 MEDICATION MANAGEMENT: ICD-10-CM

## 2020-05-21 PROBLEM — K64.8 INTERNAL HEMORRHOIDS: Status: ACTIVE | Noted: 2019-07-25

## 2020-05-21 PROBLEM — K63.5 COLON POLYP: Status: ACTIVE | Noted: 2019-07-25

## 2020-05-21 PROBLEM — K57.30 DIVERTICULA, COLON: Status: ACTIVE | Noted: 2019-07-25

## 2020-05-21 PROBLEM — K22.9 IRREGULAR Z LINE OF ESOPHAGUS: Status: ACTIVE | Noted: 2019-07-25

## 2020-05-21 PROBLEM — R19.8 SYMPTOMS OF GASTROESOPHAGEAL REFLUX: Status: ACTIVE | Noted: 2019-07-25

## 2020-05-21 PROCEDURE — 99213 OFFICE O/P EST LOW 20 MIN: CPT | Performed by: FAMILY MEDICINE

## 2020-05-21 RX ORDER — AZITHROMYCIN 250 MG/1
TABLET, FILM COATED ORAL
Qty: 6 TABLET | Refills: 0 | Status: SHIPPED | OUTPATIENT
Start: 2020-05-21 | End: 2020-05-26

## 2020-05-21 RX ORDER — FLUCONAZOLE 150 MG/1
TABLET ORAL
Qty: 2 TABLET | Refills: 0 | Status: SHIPPED | OUTPATIENT
Start: 2020-05-21 | End: 2020-12-10 | Stop reason: ALTCHOICE

## 2020-05-21 NOTE — PROGRESS NOTES
Virtual Telephone Check-In    Caterina Hughes verbally consents to a Virtual/Telephone Check-In visit on 05/21/20. Patient has been referred to the [facility] website at [email or web address] to review the yearly Consent to Treat document.     Patient underst capsule, Rfl: 0  ClonazePAM 0.5 MG Oral Tab, 1/2 - 1 po BID prn. No driving and no alcohol if taking. (Patient not taking: Reported on 5/21/2020 ), Disp: 60 tablet, Rfl: 0  Betamethasone Dipropionate Aug (DIPROLENE AF) 0.05 % External Cream, Apply BID.  Max visit    GENERAL: AAO x 3; pleasant; speaking in full sentences  ASSESSMENT AND PLAN:   Acute non-recurrent sinusitis of other sinus  (primary encounter diagnosis)  Medication management    No orders of the defined types were placed in this encounter. management  -     fluconazole (DIFLUCAN) 150 MG Oral Tab; 1 po once. Repeat once after 3 days if needed.           Grisel Soto DO

## 2020-05-21 NOTE — TELEPHONE ENCOUNTER
I spoke with pt. She has a ST, nasal congestion, green mucous. She denies a fever, cough or shortness of breath. She tested negative for COVID-19 on Monday 05/18/2020. Pt. Is not wanting to come in because she lives an hour away.  She would like you to cons

## 2020-05-21 NOTE — TELEPHONE ENCOUNTER
1. What are your symptoms? Difficulty swallowing, sore throat, ears ache, green mucus coating in throat. 2. How long have you been having these symptoms? 2 Days    3. Have you done anything already to treat your symptoms?      Rising nose w/salty w

## 2020-05-21 NOTE — TELEPHONE ENCOUNTER
Pt was forwarded to [facility] to schedule a tele visit with Dr. Darin Urbina.

## 2020-08-13 ENCOUNTER — TELEPHONE (OUTPATIENT)
Dept: FAMILY MEDICINE CLINIC | Facility: CLINIC | Age: 55
End: 2020-08-13

## 2020-08-13 RX ORDER — ZOLMITRIPTAN 5 MG/1
SPRAY NASAL
Qty: 6 EACH | Refills: 0 | Status: SHIPPED | OUTPATIENT
Start: 2020-08-13 | End: 2020-10-16

## 2020-08-15 RX ORDER — DROSPIRENONE AND ETHINYL ESTRADIOL 0.03MG-3MG
KIT ORAL
Qty: 84 TABLET | Refills: 0 | Status: SHIPPED | OUTPATIENT
Start: 2020-08-15 | End: 2020-12-10

## 2020-09-18 ENCOUNTER — MED REC SCAN ONLY (OUTPATIENT)
Dept: FAMILY MEDICINE CLINIC | Facility: CLINIC | Age: 55
End: 2020-09-18

## 2020-09-24 ENCOUNTER — OFFICE VISIT (OUTPATIENT)
Dept: FAMILY MEDICINE CLINIC | Facility: CLINIC | Age: 55
End: 2020-09-24
Payer: COMMERCIAL

## 2020-09-24 VITALS
WEIGHT: 161 LBS | HEART RATE: 80 BPM | RESPIRATION RATE: 18 BRPM | HEIGHT: 65.5 IN | BODY MASS INDEX: 26.5 KG/M2 | SYSTOLIC BLOOD PRESSURE: 110 MMHG | TEMPERATURE: 98 F | DIASTOLIC BLOOD PRESSURE: 80 MMHG

## 2020-09-24 DIAGNOSIS — Z00.00 BLOOD TESTS FOR ROUTINE GENERAL PHYSICAL EXAMINATION: ICD-10-CM

## 2020-09-24 DIAGNOSIS — Z01.419 ENCOUNTER FOR ROUTINE GYNECOLOGICAL EXAMINATION WITH PAPANICOLAOU SMEAR OF CERVIX: ICD-10-CM

## 2020-09-24 DIAGNOSIS — Z12.31 ENCOUNTER FOR SCREENING MAMMOGRAM FOR MALIGNANT NEOPLASM OF BREAST: ICD-10-CM

## 2020-09-24 DIAGNOSIS — E53.8 VITAMIN B12 DEFICIENCY: ICD-10-CM

## 2020-09-24 DIAGNOSIS — Z13.89 SCREENING FOR GENITOURINARY CONDITION: ICD-10-CM

## 2020-09-24 DIAGNOSIS — Z23 NEED FOR VACCINATION: ICD-10-CM

## 2020-09-24 DIAGNOSIS — Z00.00 ROUTINE PHYSICAL EXAMINATION: Primary | ICD-10-CM

## 2020-09-24 PROCEDURE — 90471 IMMUNIZATION ADMIN: CPT | Performed by: FAMILY MEDICINE

## 2020-09-24 PROCEDURE — 3008F BODY MASS INDEX DOCD: CPT | Performed by: FAMILY MEDICINE

## 2020-09-24 PROCEDURE — 3074F SYST BP LT 130 MM HG: CPT | Performed by: FAMILY MEDICINE

## 2020-09-24 PROCEDURE — 3079F DIAST BP 80-89 MM HG: CPT | Performed by: FAMILY MEDICINE

## 2020-09-24 PROCEDURE — 99396 PREV VISIT EST AGE 40-64: CPT | Performed by: FAMILY MEDICINE

## 2020-09-24 PROCEDURE — 90686 IIV4 VACC NO PRSV 0.5 ML IM: CPT | Performed by: FAMILY MEDICINE

## 2020-09-24 RX ORDER — FLUOXETINE 10 MG/1
10 CAPSULE ORAL DAILY
Qty: 90 CAPSULE | Refills: 1 | Status: SHIPPED | OUTPATIENT
Start: 2020-09-24 | End: 2021-03-18

## 2020-09-24 RX ORDER — CIPROFLOXACIN 500 MG/1
TABLET, FILM COATED ORAL
COMMUNITY
Start: 2020-09-04 | End: 2020-09-24

## 2020-09-24 NOTE — PROGRESS NOTES
CHIEF COMPLAINT       Annual Physical Exam  HPI:   Radha Gao is a 50year old female who presents for a complete physical exam.   Still on OCP - uses for birth control. Still getting menses regularly. Doesn't want to stop pill at this point.   Aware o (Patient not taking: Reported on 5/21/2020 ) 60 tablet 0   • Betamethasone Dipropionate Aug (DIPROLENE AF) 0.05 % External Cream Apply BID. Max 2 weeks.  (Patient not taking: Reported on 5/21/2020 ) 30 g 0   • aspirin-butalbital-caffeine -40 MG Oral C chest pain on exertion  GI: no complaint of pain,denies heartburn  : No complains of dysuria or vaginal discharge  MUSCULOSKELETAL: no complaint of back pain  NEURO: no complaint of headaches  PSYCHE: stable with fluoxetine  HEMATOLOGIC: denies hx of ane b12 deficiency  Need for vaccination    Orders Placed This Encounter      THIN PREP PAP W/ HPV REFLEX [06669][Q]      T4 FREE [866] [Q]      TSH [899] [Q]      CBC [6199] [Q]      COMP METABOLIC PANEL [36171] [Q]      URINALYSIS, ROUTINE [8823][Q]      VIT

## 2020-10-16 ENCOUNTER — HOSPITAL ENCOUNTER (OUTPATIENT)
Dept: MAMMOGRAPHY | Age: 55
Discharge: HOME | End: 2020-10-16
Attending: FAMILY MEDICINE
Payer: COMMERCIAL

## 2020-10-16 DIAGNOSIS — Z12.31 ENCOUNTER FOR SCREENING MAMMOGRAM FOR MALIGNANT NEOPLASM OF BREAST: ICD-10-CM

## 2020-10-16 DIAGNOSIS — Z00.00 ROUTINE PHYSICAL EXAMINATION: ICD-10-CM

## 2020-10-16 PROCEDURE — 77067 SCR MAMMO BI INCL CAD: CPT | Performed by: FAMILY MEDICINE

## 2020-10-16 PROCEDURE — 77063 BREAST TOMOSYNTHESIS BI: CPT | Performed by: FAMILY MEDICINE

## 2020-10-16 RX ORDER — ZOLMITRIPTAN 5 MG/1
SPRAY, METERED NASAL
Qty: 6 EACH | Refills: 0 | Status: SHIPPED | OUTPATIENT
Start: 2020-10-16

## 2020-12-10 ENCOUNTER — OFFICE VISIT (OUTPATIENT)
Dept: FAMILY MEDICINE CLINIC | Facility: CLINIC | Age: 55
End: 2020-12-10
Payer: COMMERCIAL

## 2020-12-10 VITALS
RESPIRATION RATE: 12 BRPM | HEART RATE: 72 BPM | DIASTOLIC BLOOD PRESSURE: 80 MMHG | SYSTOLIC BLOOD PRESSURE: 120 MMHG | BODY MASS INDEX: 27.33 KG/M2 | HEIGHT: 65.5 IN | WEIGHT: 166 LBS

## 2020-12-10 DIAGNOSIS — N95.1 MENOPAUSAL SYMPTOMS: Primary | ICD-10-CM

## 2020-12-10 DIAGNOSIS — Z13.220 LIPID SCREENING: ICD-10-CM

## 2020-12-10 DIAGNOSIS — R23.2 HOT FLASHES: ICD-10-CM

## 2020-12-10 PROCEDURE — 3079F DIAST BP 80-89 MM HG: CPT | Performed by: FAMILY MEDICINE

## 2020-12-10 PROCEDURE — 99214 OFFICE O/P EST MOD 30 MIN: CPT | Performed by: FAMILY MEDICINE

## 2020-12-10 PROCEDURE — 3074F SYST BP LT 130 MM HG: CPT | Performed by: FAMILY MEDICINE

## 2020-12-10 PROCEDURE — 3008F BODY MASS INDEX DOCD: CPT | Performed by: FAMILY MEDICINE

## 2020-12-10 RX ORDER — ZOLPIDEM TARTRATE 10 MG/1
TABLET ORAL
Qty: 90 TABLET | Refills: 0 | Status: SHIPPED | OUTPATIENT
Start: 2020-12-10 | End: 2021-06-11

## 2020-12-10 RX ORDER — ESTROGEN,CON/M-PROGEST ACET 0.3-1.5MG
1 TABLET ORAL DAILY
Qty: 3 PACKAGE | Refills: 2 | Status: SHIPPED | OUTPATIENT
Start: 2020-12-10 | End: 2021-07-19

## 2020-12-10 NOTE — PROGRESS NOTES
Serina Yoder is a 50year old female. CHIEF COMPLAINT   Menopausal symptoms  HPI:   Mammogram 10/2020. OCP stopped two months ago. LMP:  2 months ago. More mood swings and \"horrible\" hot flashes and night sweats. Not sleeping well.   Taking zolpid status migrainosus    • Other B-complex deficiencies    • Other screening mammogram    • Personal history of urinary (tract) infection    • Routine Papanicolaou smear       Social History:  Social History    Tobacco Use      Smoking status: Never Smoker

## 2021-01-11 ENCOUNTER — TELEPHONE (OUTPATIENT)
Dept: FAMILY MEDICINE CLINIC | Facility: CLINIC | Age: 56
End: 2021-01-11

## 2021-01-11 RX ORDER — ALPRAZOLAM 0.5 MG/1
TABLET ORAL
Qty: 20 TABLET | Refills: 0 | Status: SHIPPED | OUTPATIENT
Start: 2021-01-11 | End: 2021-07-19

## 2021-01-11 NOTE — TELEPHONE ENCOUNTER
Alprazolam 0.5 mg si/2 - 1 po once daily prn. No driving and no alcohol. #20. My condolences to her.

## 2021-01-11 NOTE — TELEPHONE ENCOUNTER
Call back to pt-advised of pako MEDRANO comments, recommendations and condolences. Explained dosing instructions and reinforced no driving or alcohol while taking this medication.    Advised to allow pharmacist to  her on this new medication-may need

## 2021-01-11 NOTE — TELEPHONE ENCOUNTER
Pt transferred live to triage tearfully reporting 39 yr old step-daughter committed suicide yesterday-sts they had no warning signs. sts step-daughter lived out of state in [place]-has a family there.  sts they will be going to [facility] tomorrow to help h

## 2021-02-24 RX ORDER — ESTROGEN,CON/M-PROGEST ACET 0.3-1.5MG
1 TABLET ORAL DAILY
Qty: 3 PACKAGE | Refills: 2 | Status: CANCELLED | OUTPATIENT
Start: 2021-02-24

## 2021-03-18 RX ORDER — FLUOXETINE 10 MG/1
10 CAPSULE ORAL DAILY
Qty: 90 CAPSULE | Refills: 0 | Status: SHIPPED | OUTPATIENT
Start: 2021-03-18 | End: 2021-06-21

## 2021-06-08 RX ORDER — ZOLPIDEM TARTRATE 10 MG/1
TABLET ORAL
Qty: 90 TABLET | Refills: 0 | Status: CANCELLED | OUTPATIENT
Start: 2021-06-08

## 2021-06-09 RX ORDER — ZOLMITRIPTAN 5 MG/1
SPRAY, METERED NASAL
Qty: 6 EACH | Refills: 0 | OUTPATIENT
Start: 2021-06-09

## 2021-06-09 NOTE — TELEPHONE ENCOUNTER
Patient is requesting change of ambien from regular to controlled release. Last seen in December. Please schedule follow up and we will discuss medication change. Ok to do video if she prefers since she doesn't live close to the office.

## 2021-06-11 RX ORDER — ZOLPIDEM TARTRATE 12.5 MG/1
TABLET, FILM COATED, EXTENDED RELEASE ORAL
Qty: 30 TABLET | Refills: 0 | Status: SHIPPED | OUTPATIENT
Start: 2021-06-11 | End: 2021-07-19

## 2021-06-11 NOTE — TELEPHONE ENCOUNTER
OV scheduled July 16 (first available). Pt said she has been using regular Ambien as needed but tried her 's XR and it works better.

## 2021-06-21 RX ORDER — FLUOXETINE 10 MG/1
10 CAPSULE ORAL DAILY
Qty: 90 CAPSULE | Refills: 0 | Status: SHIPPED | OUTPATIENT
Start: 2021-06-21 | End: 2021-07-19

## 2021-06-23 ENCOUNTER — OFFICE VISIT (OUTPATIENT)
Dept: FAMILY MEDICINE CLINIC | Facility: CLINIC | Age: 56
End: 2021-06-23
Payer: COMMERCIAL

## 2021-06-23 ENCOUNTER — TELEPHONE (OUTPATIENT)
Dept: FAMILY MEDICINE CLINIC | Facility: CLINIC | Age: 56
End: 2021-06-23

## 2021-06-23 ENCOUNTER — HOSPITAL ENCOUNTER (OUTPATIENT)
Dept: ULTRASOUND IMAGING | Facility: HOSPITAL | Age: 56
Discharge: HOME | End: 2021-06-23
Attending: NURSE PRACTITIONER
Payer: COMMERCIAL

## 2021-06-23 VITALS
WEIGHT: 169 LBS | HEIGHT: 66 IN | HEART RATE: 71 BPM | BODY MASS INDEX: 27.16 KG/M2 | TEMPERATURE: 98 F | OXYGEN SATURATION: 98 % | DIASTOLIC BLOOD PRESSURE: 63 MMHG | RESPIRATION RATE: 16 BRPM | SYSTOLIC BLOOD PRESSURE: 118 MMHG

## 2021-06-23 DIAGNOSIS — R10.11 RUQ PAIN: ICD-10-CM

## 2021-06-23 DIAGNOSIS — K82.4 GALLBLADDER POLYP: ICD-10-CM

## 2021-06-23 DIAGNOSIS — R10.11 RIGHT UPPER QUADRANT PAIN: Primary | ICD-10-CM

## 2021-06-23 PROCEDURE — 76700 US EXAM ABDOM COMPLETE: CPT | Performed by: NURSE PRACTITIONER

## 2021-06-23 PROCEDURE — 3074F SYST BP LT 130 MM HG: CPT | Performed by: NURSE PRACTITIONER

## 2021-06-23 PROCEDURE — 3008F BODY MASS INDEX DOCD: CPT | Performed by: NURSE PRACTITIONER

## 2021-06-23 PROCEDURE — 99214 OFFICE O/P EST MOD 30 MIN: CPT | Performed by: NURSE PRACTITIONER

## 2021-06-23 PROCEDURE — 3078F DIAST BP <80 MM HG: CPT | Performed by: NURSE PRACTITIONER

## 2021-06-23 NOTE — PROGRESS NOTES
Caitlin Linton is a 50year old female.   HPI:   Patient presents today reporting right upper quadrant pain she experienced yesterday starting at 1800 until about 0200 today- reports pain occurred after eating- had some chicken noodle soup, roasted red peppe aspirin-butalbital-caffeine -40 MG Oral Cap Take 1 capsule by mouth every 6 (six) hours as needed for Headaches.  (Patient not taking: Reported on 5/21/2020 ) 90 capsule 0      Past Medical History:   Diagnosis Date   • Anxiety state, unspecified    • diagnosis)  Gallbladder polyp    No orders of the defined types were placed in this encounter.       Meds & Refills for this Visit:  Requested Prescriptions      No prescriptions requested or ordered in this encounter       Imaging & Consults:  None    Foll

## 2021-07-19 ENCOUNTER — OFFICE VISIT (OUTPATIENT)
Dept: FAMILY MEDICINE CLINIC | Facility: CLINIC | Age: 56
End: 2021-07-19
Payer: COMMERCIAL

## 2021-07-19 VITALS
RESPIRATION RATE: 12 BRPM | HEART RATE: 76 BPM | BODY MASS INDEX: 27 KG/M2 | HEIGHT: 66 IN | SYSTOLIC BLOOD PRESSURE: 112 MMHG | WEIGHT: 168 LBS | DIASTOLIC BLOOD PRESSURE: 80 MMHG

## 2021-07-19 DIAGNOSIS — Z12.31 BREAST CANCER SCREENING BY MAMMOGRAM: ICD-10-CM

## 2021-07-19 DIAGNOSIS — G47.00 INSOMNIA, UNSPECIFIED TYPE: ICD-10-CM

## 2021-07-19 DIAGNOSIS — F41.9 ANXIETY: ICD-10-CM

## 2021-07-19 DIAGNOSIS — N95.1 MENOPAUSAL SYMPTOMS: Primary | ICD-10-CM

## 2021-07-19 PROCEDURE — 3079F DIAST BP 80-89 MM HG: CPT | Performed by: FAMILY MEDICINE

## 2021-07-19 PROCEDURE — 99214 OFFICE O/P EST MOD 30 MIN: CPT | Performed by: FAMILY MEDICINE

## 2021-07-19 PROCEDURE — 3074F SYST BP LT 130 MM HG: CPT | Performed by: FAMILY MEDICINE

## 2021-07-19 PROCEDURE — 3008F BODY MASS INDEX DOCD: CPT | Performed by: FAMILY MEDICINE

## 2021-07-19 RX ORDER — CLINDAMYCIN PHOSPHATE 20 MG/G
CREAM VAGINAL
Qty: 40 G | Refills: 1 | Status: SHIPPED | OUTPATIENT
Start: 2021-07-19

## 2021-07-19 RX ORDER — ESTROGEN,CON/M-PROGEST ACET 0.3-1.5MG
1 TABLET ORAL DAILY
Qty: 90 TABLET | Refills: 1 | Status: SHIPPED | OUTPATIENT
Start: 2021-07-19 | End: 2021-08-21

## 2021-07-19 RX ORDER — FLUOXETINE 10 MG/1
10 CAPSULE ORAL DAILY
Qty: 90 CAPSULE | Refills: 1 | Status: SHIPPED | OUTPATIENT
Start: 2021-07-19

## 2021-07-19 RX ORDER — ZOLPIDEM TARTRATE 12.5 MG/1
TABLET, FILM COATED, EXTENDED RELEASE ORAL
Qty: 90 TABLET | Refills: 1 | Status: SHIPPED | OUTPATIENT
Start: 2021-07-19

## 2021-07-19 RX ORDER — ALPRAZOLAM 0.5 MG/1
TABLET ORAL
Qty: 20 TABLET | Refills: 0 | Status: SHIPPED | OUTPATIENT
Start: 2021-07-19

## 2021-07-19 RX ORDER — NITROFURANTOIN 25; 75 MG/1; MG/1
CAPSULE ORAL
Qty: 90 CAPSULE | Refills: 0 | Status: SHIPPED | OUTPATIENT
Start: 2021-07-19

## 2021-07-19 NOTE — PROGRESS NOTES
Clarita Izaguirre is a 50year old female. CHIEF COMPLAINT   Follow up on medications  HPI:   \"I feel great! \". Taking prempro every other day. Taking fluoxetine every other day also. LMP:  9/2020. Takes ambien prn. ER works better.     Takes alprazol pain, unspecified 12/09   • General counseling for prescription of oral contraceptives    • Microscopic hematuria    • Migraine, unspecified, without mention of intractable migraine without mention of status migrainosus    • Other B-complex deficiencies tablet by mouth daily as needed for anxiety or sleep. No driving or alcohol when taking this medication. Do not take with Ambien. • Clindamycin Phosphate 2 % Vaginal Cream 40 g 1     Sig: Insert 1 applicatorful vaginally at HS x 3 nights.    • Nitrofuran

## 2021-08-21 RX ORDER — ESTROGEN,CON/M-PROGEST ACET 0.3-1.5MG
1 TABLET ORAL DAILY
Qty: 84 TABLET | Refills: 0 | Status: SHIPPED | OUTPATIENT
Start: 2021-08-21

## 2021-11-10 ENCOUNTER — WALK IN (OUTPATIENT)
Dept: URGENT CARE | Age: 56
End: 2021-11-10

## 2021-11-10 VITALS
BODY MASS INDEX: 26.52 KG/M2 | OXYGEN SATURATION: 100 % | TEMPERATURE: 98.1 F | SYSTOLIC BLOOD PRESSURE: 118 MMHG | WEIGHT: 165 LBS | HEIGHT: 66 IN | DIASTOLIC BLOOD PRESSURE: 62 MMHG | HEART RATE: 78 BPM | RESPIRATION RATE: 18 BRPM

## 2021-11-10 DIAGNOSIS — J02.9 SORE THROAT: ICD-10-CM

## 2021-11-10 DIAGNOSIS — J02.9 ACUTE PHARYNGITIS, UNSPECIFIED ETIOLOGY: Primary | ICD-10-CM

## 2021-11-10 LAB
INTERNAL PROCEDURAL CONTROLS ACCEPTABLE: YES
S PYO AG THROAT QL IA.RAPID: NEGATIVE

## 2021-11-10 PROCEDURE — 99213 OFFICE O/P EST LOW 20 MIN: CPT | Performed by: NURSE PRACTITIONER

## 2021-11-10 PROCEDURE — 87081 CULTURE SCREEN ONLY: CPT | Performed by: PSYCHIATRY & NEUROLOGY

## 2021-11-10 PROCEDURE — 87147 CULTURE TYPE IMMUNOLOGIC: CPT | Performed by: PSYCHIATRY & NEUROLOGY

## 2021-11-10 PROCEDURE — 87880 STREP A ASSAY W/OPTIC: CPT | Performed by: NURSE PRACTITIONER

## 2021-11-10 RX ORDER — FLUOXETINE 10 MG/1
CAPSULE ORAL
COMMUNITY
Start: 2021-09-22

## 2021-11-10 RX ORDER — NAPROXEN 500 MG/1
500 TABLET ORAL EVERY 12 HOURS PRN
Qty: 21 TABLET | Refills: 0 | Status: SHIPPED | OUTPATIENT
Start: 2021-11-10 | End: 2023-02-10 | Stop reason: ALTCHOICE

## 2021-11-10 RX ORDER — ALPRAZOLAM 0.5 MG/1
TABLET ORAL
COMMUNITY
Start: 2021-07-19 | End: 2023-02-10 | Stop reason: ALTCHOICE

## 2021-11-10 RX ORDER — ESTROGEN,CON/M-PROGEST ACET 0.3-1.5MG
1 TABLET ORAL DAILY
COMMUNITY
Start: 2021-08-23 | End: 2023-02-10 | Stop reason: ALTCHOICE

## 2021-11-10 ASSESSMENT — ENCOUNTER SYMPTOMS
PSYCHIATRIC NEGATIVE: 1
STRIDOR: 0
CONSTITUTIONAL NEGATIVE: 1
COUGH: 0
TROUBLE SWALLOWING: 0
HOARSE VOICE: 0
ABDOMINAL PAIN: 0
GASTROINTESTINAL NEGATIVE: 1
SWOLLEN GLANDS: 0
VOMITING: 0
DIARRHEA: 0
HEADACHES: 0
RESPIRATORY NEGATIVE: 1
SORE THROAT: 1
SHORTNESS OF BREATH: 0
NEUROLOGICAL NEGATIVE: 1

## 2021-11-12 LAB — S PYO SPEC QL CULT: ABNORMAL

## 2021-11-12 RX ORDER — AMOXICILLIN 875 MG/1
875 TABLET, COATED ORAL EVERY 12 HOURS
Qty: 14 TABLET | Refills: 0 | Status: SHIPPED | OUTPATIENT
Start: 2021-11-12 | End: 2021-11-19

## 2022-03-03 RX ORDER — ALPRAZOLAM 0.5 MG/1
TABLET ORAL
Qty: 20 TABLET | Refills: 0 | Status: SHIPPED | OUTPATIENT
Start: 2022-03-03

## 2022-03-03 NOTE — TELEPHONE ENCOUNTER
LOV: 7/19/21 (menopausal)   Last Refill: 7/19/21, #20, 0 RF  Next OV: N/A    Please authorize if acceptable. Thank you!

## 2022-03-15 ENCOUNTER — WALK IN (OUTPATIENT)
Dept: URGENT CARE | Age: 57
End: 2022-03-15

## 2022-03-15 VITALS
HEART RATE: 78 BPM | TEMPERATURE: 98.1 F | HEIGHT: 66 IN | DIASTOLIC BLOOD PRESSURE: 78 MMHG | SYSTOLIC BLOOD PRESSURE: 118 MMHG | WEIGHT: 165 LBS | BODY MASS INDEX: 26.52 KG/M2 | RESPIRATION RATE: 16 BRPM | OXYGEN SATURATION: 100 %

## 2022-03-15 DIAGNOSIS — R35.0 FREQUENCY OF URINATION: ICD-10-CM

## 2022-03-15 DIAGNOSIS — R39.9 UTI SYMPTOMS: Primary | ICD-10-CM

## 2022-03-15 PROBLEM — K29.70 GASTRITIS: Status: ACTIVE | Noted: 2019-07-25

## 2022-03-15 PROBLEM — K63.5 COLON POLYP: Status: ACTIVE | Noted: 2019-07-25

## 2022-03-15 PROBLEM — Z12.11 SCREENING FOR COLON CANCER: Status: ACTIVE | Noted: 2019-07-25

## 2022-03-15 PROBLEM — R45.86 MOOD CHANGES: Status: ACTIVE | Noted: 2018-03-20

## 2022-03-15 PROBLEM — K57.30 DIVERTICULA, COLON: Status: ACTIVE | Noted: 2019-07-25

## 2022-03-15 PROBLEM — K64.8 INTERNAL HEMORRHOIDS: Status: ACTIVE | Noted: 2019-07-25

## 2022-03-15 PROBLEM — K22.9 IRREGULAR Z LINE OF ESOPHAGUS: Status: ACTIVE | Noted: 2019-07-25

## 2022-03-15 LAB
APPEARANCE, POC: CLEAR
BILIRUBIN, POC: NEGATIVE
COLOR, POC: YELLOW
GLUCOSE UR-MCNC: NEGATIVE MG/DL
KETONES, POC: NEGATIVE MG/DL
NITRITE, POC: NEGATIVE
OCCULT BLOOD, POC: NEGATIVE
PH UR: 5 [PH] (ref 5–7)
PROT UR-MCNC: NEGATIVE MG/DL
SP GR UR: 1.01 (ref 1–1.03)
UROBILINOGEN UR-MCNC: 0.2 MG/DL (ref 0–1)
WBC (LEUKOCYTE) ESTERASE, POC: ABNORMAL

## 2022-03-15 PROCEDURE — 81002 URINALYSIS NONAUTO W/O SCOPE: CPT | Performed by: NURSE PRACTITIONER

## 2022-03-15 PROCEDURE — 99213 OFFICE O/P EST LOW 20 MIN: CPT | Performed by: NURSE PRACTITIONER

## 2022-03-15 PROCEDURE — 87086 URINE CULTURE/COLONY COUNT: CPT | Performed by: PSYCHIATRY & NEUROLOGY

## 2022-03-15 RX ORDER — NITROFURANTOIN 25; 75 MG/1; MG/1
100 CAPSULE ORAL EVERY 12 HOURS
Qty: 14 CAPSULE | Refills: 0 | Status: SHIPPED | OUTPATIENT
Start: 2022-03-15 | End: 2022-03-22

## 2022-03-15 RX ORDER — FLUOXETINE 10 MG/1
CAPSULE ORAL
Qty: 30 CAPSULE | Refills: 1 | Status: SHIPPED | OUTPATIENT
Start: 2022-03-15

## 2022-03-15 ASSESSMENT — ENCOUNTER SYMPTOMS
VOMITING: 0
CHILLS: 0
NAUSEA: 0
SWEATS: 0

## 2022-03-17 ENCOUNTER — TELEPHONE (OUTPATIENT)
Dept: URGENT CARE | Age: 57
End: 2022-03-17

## 2022-03-17 LAB — BACTERIA UR CULT: NORMAL

## 2022-08-08 ENCOUNTER — TELEPHONE (OUTPATIENT)
Dept: FAMILY MEDICINE CLINIC | Facility: CLINIC | Age: 57
End: 2022-08-08

## 2022-08-10 NOTE — TELEPHONE ENCOUNTER
Call to pt's cell to discuss call info below, reaches identified voice mail. Per hipaa consent left vmm req call back to any triage nurse in our ofc tomorrow.  Provided ofc phone hours/contact number
Pt called back back on this. Reports ongoing cough/wheezing at night when lying flat x one mos. Feels fine during day. occ mucous, yellow with cough  Wonders if allergies. No fevers. Feels the cough and wheezing awake her most nights. No cp. No fevers. Has not tested for covid during that time, sts \"I just had my booster\". I advised she should home test just to r/o covid as we want to avoid bringing into ofc. I let her know vaccine is not a guarantee in her not getting covid. She agrees to obtain home test prior to seeing you. I booked her this Friday. She is aware if + covid will need to r/s or possibly urgent care.   Also wants to discuss possible abx for \"just in case\" for upcoming travel--[place] country [place]    Sending as Zo Linares
Pt states has cough for 1 month. Takes mucinex for relief     Up to date on  All Covid vaccines. Hard to breath at night. Hears wheezing. Denies fever. Other symptoms inculde, sneezing. Pt states its worse when she lays down.
(2) assistive person

## 2022-08-12 ENCOUNTER — HOSPITAL ENCOUNTER (OUTPATIENT)
Dept: GENERAL RADIOLOGY | Age: 57
Discharge: HOME | End: 2022-08-12
Attending: FAMILY MEDICINE
Payer: COMMERCIAL

## 2022-08-12 ENCOUNTER — OFFICE VISIT (OUTPATIENT)
Dept: FAMILY MEDICINE CLINIC | Facility: CLINIC | Age: 57
End: 2022-08-12
Payer: COMMERCIAL

## 2022-08-12 VITALS
DIASTOLIC BLOOD PRESSURE: 76 MMHG | BODY MASS INDEX: 28.45 KG/M2 | HEIGHT: 66 IN | HEART RATE: 88 BPM | WEIGHT: 177 LBS | SYSTOLIC BLOOD PRESSURE: 124 MMHG | OXYGEN SATURATION: 99 % | RESPIRATION RATE: 12 BRPM | TEMPERATURE: 98 F

## 2022-08-12 DIAGNOSIS — R06.2 WHEEZING: ICD-10-CM

## 2022-08-12 DIAGNOSIS — Z01.812 ENCOUNTER FOR PREPROCEDURE SCREENING LABORATORY TESTING FOR COVID-19: ICD-10-CM

## 2022-08-12 DIAGNOSIS — Z20.822 ENCOUNTER FOR PREPROCEDURE SCREENING LABORATORY TESTING FOR COVID-19: ICD-10-CM

## 2022-08-12 DIAGNOSIS — R06.2 WHEEZING: Primary | ICD-10-CM

## 2022-08-12 DIAGNOSIS — Z12.31 BREAST CANCER SCREENING BY MAMMOGRAM: ICD-10-CM

## 2022-08-12 PROCEDURE — 3078F DIAST BP <80 MM HG: CPT | Performed by: FAMILY MEDICINE

## 2022-08-12 PROCEDURE — 3074F SYST BP LT 130 MM HG: CPT | Performed by: FAMILY MEDICINE

## 2022-08-12 PROCEDURE — 99214 OFFICE O/P EST MOD 30 MIN: CPT | Performed by: FAMILY MEDICINE

## 2022-08-12 PROCEDURE — 3008F BODY MASS INDEX DOCD: CPT | Performed by: FAMILY MEDICINE

## 2022-08-12 PROCEDURE — 71046 X-RAY EXAM CHEST 2 VIEWS: CPT | Performed by: FAMILY MEDICINE

## 2022-08-12 RX ORDER — AZITHROMYCIN 250 MG/1
TABLET, FILM COATED ORAL
Qty: 6 TABLET | Refills: 0 | Status: SHIPPED | OUTPATIENT
Start: 2022-08-12 | End: 2022-08-17

## 2022-08-12 RX ORDER — MONTELUKAST SODIUM 10 MG/1
10 TABLET ORAL DAILY
Qty: 90 TABLET | Refills: 0 | Status: SHIPPED | OUTPATIENT
Start: 2022-08-12 | End: 2023-08-07

## 2022-08-12 RX ORDER — ESTROGEN,CON/M-PROGEST ACET 0.3-1.5MG
TABLET ORAL
Qty: 28 TABLET | Refills: 0 | Status: SHIPPED | OUTPATIENT
Start: 2022-08-12

## 2022-08-12 RX ORDER — FLUOXETINE 10 MG/1
10 CAPSULE ORAL DAILY
Qty: 90 CAPSULE | Refills: 1 | Status: SHIPPED | OUTPATIENT
Start: 2022-08-12

## 2022-08-12 NOTE — PATIENT INSTRUCTIONS
Stop the generic allergy capsule    Start generic Zyrtec 10mg daily or generic Allegra 180mg daily or generic Claritin 10mg daily.

## 2022-08-16 ENCOUNTER — LAB ENCOUNTER (OUTPATIENT)
Dept: LAB | Age: 57
End: 2022-08-16
Attending: FAMILY MEDICINE
Payer: COMMERCIAL

## 2022-08-16 DIAGNOSIS — Z01.812 ENCOUNTER FOR PREPROCEDURE SCREENING LABORATORY TESTING FOR COVID-19: ICD-10-CM

## 2022-08-16 DIAGNOSIS — Z20.822 ENCOUNTER FOR PREPROCEDURE SCREENING LABORATORY TESTING FOR COVID-19: ICD-10-CM

## 2022-08-16 LAB — SARS-COV-2 RNA RESP QL NAA+PROBE: NOT DETECTED

## 2022-09-02 NOTE — TELEPHONE ENCOUNTER
Continue Restasis bid ou. S/w pt. Reports after 3 doses of her metronidazole she has developed generalized back pain, states it feels muscular, denies any sharp or shooting pains, says heating pad helps.   Also has h/a since then, not debilitating, has been taking advil with relief

## 2022-10-03 ENCOUNTER — HOSPITAL ENCOUNTER (OUTPATIENT)
Dept: MAMMOGRAPHY | Age: 57
Discharge: HOME | End: 2022-10-03
Attending: FAMILY MEDICINE
Payer: COMMERCIAL

## 2022-10-03 DIAGNOSIS — Z12.31 BREAST CANCER SCREENING BY MAMMOGRAM: ICD-10-CM

## 2022-10-03 PROCEDURE — 77063 BREAST TOMOSYNTHESIS BI: CPT | Performed by: FAMILY MEDICINE

## 2022-10-03 PROCEDURE — 77067 SCR MAMMO BI INCL CAD: CPT | Performed by: FAMILY MEDICINE

## 2022-10-06 ENCOUNTER — OFFICE VISIT (OUTPATIENT)
Dept: FAMILY MEDICINE CLINIC | Facility: CLINIC | Age: 57
End: 2022-10-06
Payer: COMMERCIAL

## 2022-10-06 ENCOUNTER — LAB ENCOUNTER (OUTPATIENT)
Dept: LAB | Age: 57
End: 2022-10-06
Attending: FAMILY MEDICINE
Payer: COMMERCIAL

## 2022-10-06 VITALS
HEIGHT: 66 IN | SYSTOLIC BLOOD PRESSURE: 116 MMHG | HEART RATE: 76 BPM | WEIGHT: 181.19 LBS | RESPIRATION RATE: 16 BRPM | BODY MASS INDEX: 29.12 KG/M2 | TEMPERATURE: 99 F | DIASTOLIC BLOOD PRESSURE: 82 MMHG

## 2022-10-06 DIAGNOSIS — Z00.00 BLOOD TESTS FOR ROUTINE GENERAL PHYSICAL EXAMINATION: ICD-10-CM

## 2022-10-06 DIAGNOSIS — R94.6 ABNORMAL THYROID EXAM: ICD-10-CM

## 2022-10-06 DIAGNOSIS — Z13.820 SCREENING FOR OSTEOPOROSIS: ICD-10-CM

## 2022-10-06 DIAGNOSIS — Z00.00 ROUTINE GENERAL MEDICAL EXAMINATION AT A HEALTH CARE FACILITY: Primary | ICD-10-CM

## 2022-10-06 DIAGNOSIS — Z23 NEED FOR VACCINATION: ICD-10-CM

## 2022-10-06 DIAGNOSIS — K82.4 POLYP OF GALLBLADDER: ICD-10-CM

## 2022-10-06 DIAGNOSIS — Z12.4 PAPANICOLAOU SMEAR FOR CERVICAL CANCER SCREENING: ICD-10-CM

## 2022-10-06 LAB
ALBUMIN SERPL-MCNC: 4.2 G/DL (ref 3.4–5)
ALBUMIN/GLOB SERPL: 1 {RATIO} (ref 1–2)
ALP LIVER SERPL-CCNC: 59 U/L
ALT SERPL-CCNC: 29 U/L
ANION GAP SERPL CALC-SCNC: 7 MMOL/L (ref 0–18)
AST SERPL-CCNC: 22 U/L (ref 15–37)
BASOPHILS # BLD AUTO: 0.04 X10(3) UL (ref 0–0.2)
BASOPHILS NFR BLD AUTO: 0.5 %
BILIRUB SERPL-MCNC: 0.7 MG/DL (ref 0.1–2)
BUN BLD-MCNC: 12 MG/DL (ref 7–18)
BUN/CREAT SERPL: 12.4 (ref 10–20)
CALCIUM BLD-MCNC: 9.5 MG/DL (ref 8.5–10.1)
CHLORIDE SERPL-SCNC: 106 MMOL/L (ref 98–112)
CHOLEST SERPL-MCNC: 244 MG/DL (ref ?–200)
CO2 SERPL-SCNC: 26 MMOL/L (ref 21–32)
CREAT BLD-MCNC: 0.97 MG/DL
DEPRECATED RDW RBC AUTO: 44.3 FL (ref 35.1–46.3)
EOSINOPHIL # BLD AUTO: 0.39 X10(3) UL (ref 0–0.7)
EOSINOPHIL NFR BLD AUTO: 4.5 %
ERYTHROCYTE [DISTWIDTH] IN BLOOD BY AUTOMATED COUNT: 11.8 % (ref 11–15)
FASTING PATIENT LIPID ANSWER: YES
FASTING STATUS PATIENT QL REPORTED: YES
GFR SERPLBLD BASED ON 1.73 SQ M-ARVRAT: 69 ML/MIN/1.73M2 (ref 60–?)
GLOBULIN PLAS-MCNC: 4.2 G/DL (ref 2.8–4.4)
GLUCOSE BLD-MCNC: 96 MG/DL (ref 70–99)
HCT VFR BLD AUTO: 45.4 %
HDLC SERPL-MCNC: 58 MG/DL (ref 40–59)
HGB BLD-MCNC: 14.8 G/DL
IMM GRANULOCYTES # BLD AUTO: 0.02 X10(3) UL (ref 0–1)
IMM GRANULOCYTES NFR BLD: 0.2 %
LDLC SERPL CALC-MCNC: 164 MG/DL (ref ?–100)
LYMPHOCYTES # BLD AUTO: 3.31 X10(3) UL (ref 1–4)
LYMPHOCYTES NFR BLD AUTO: 37.9 %
MCH RBC QN AUTO: 33.3 PG (ref 26–34)
MCHC RBC AUTO-ENTMCNC: 32.6 G/DL (ref 31–37)
MCV RBC AUTO: 102 FL
MONOCYTES # BLD AUTO: 0.72 X10(3) UL (ref 0.1–1)
MONOCYTES NFR BLD AUTO: 8.2 %
NEUTROPHILS # BLD AUTO: 4.26 X10 (3) UL (ref 1.5–7.7)
NEUTROPHILS # BLD AUTO: 4.26 X10(3) UL (ref 1.5–7.7)
NEUTROPHILS NFR BLD AUTO: 48.7 %
NONHDLC SERPL-MCNC: 186 MG/DL (ref ?–130)
OSMOLALITY SERPL CALC.SUM OF ELEC: 288 MOSM/KG (ref 275–295)
PLATELET # BLD AUTO: 268 10(3)UL (ref 150–450)
POTASSIUM SERPL-SCNC: 4.2 MMOL/L (ref 3.5–5.1)
PROT SERPL-MCNC: 8.4 G/DL (ref 6.4–8.2)
RBC # BLD AUTO: 4.45 X10(6)UL
SODIUM SERPL-SCNC: 139 MMOL/L (ref 136–145)
TRIGL SERPL-MCNC: 121 MG/DL (ref 30–149)
TSI SER-ACNC: 3.41 MIU/ML (ref 0.36–3.74)
VLDLC SERPL CALC-MCNC: 24 MG/DL (ref 0–30)
WBC # BLD AUTO: 8.7 X10(3) UL (ref 4–11)

## 2022-10-06 PROCEDURE — 3008F BODY MASS INDEX DOCD: CPT | Performed by: FAMILY MEDICINE

## 2022-10-06 PROCEDURE — 80061 LIPID PANEL: CPT | Performed by: FAMILY MEDICINE

## 2022-10-06 PROCEDURE — 80050 GENERAL HEALTH PANEL: CPT | Performed by: FAMILY MEDICINE

## 2022-10-06 PROCEDURE — 3079F DIAST BP 80-89 MM HG: CPT | Performed by: FAMILY MEDICINE

## 2022-10-06 PROCEDURE — 87624 HPV HI-RISK TYP POOLED RSLT: CPT | Performed by: FAMILY MEDICINE

## 2022-10-06 PROCEDURE — 3074F SYST BP LT 130 MM HG: CPT | Performed by: FAMILY MEDICINE

## 2022-10-06 PROCEDURE — 90686 IIV4 VACC NO PRSV 0.5 ML IM: CPT | Performed by: FAMILY MEDICINE

## 2022-10-06 PROCEDURE — 90471 IMMUNIZATION ADMIN: CPT | Performed by: FAMILY MEDICINE

## 2022-10-06 RX ORDER — ESTROGEN,CON/M-PROGEST ACET 0.3-1.5MG
TABLET ORAL
Qty: 84 TABLET | Refills: 3 | Status: SHIPPED | OUTPATIENT
Start: 2022-10-06

## 2022-10-06 RX ORDER — FLUOXETINE 10 MG/1
10 CAPSULE ORAL DAILY
Qty: 90 CAPSULE | Refills: 1 | Status: SHIPPED | OUTPATIENT
Start: 2022-10-06

## 2022-10-06 RX ORDER — MONTELUKAST SODIUM 10 MG/1
10 TABLET ORAL DAILY
Qty: 90 TABLET | Refills: 1 | Status: SHIPPED | OUTPATIENT
Start: 2022-10-06 | End: 2023-10-01

## 2022-10-06 RX ORDER — NITROFURANTOIN 25; 75 MG/1; MG/1
CAPSULE ORAL
Qty: 90 CAPSULE | Refills: 0 | Status: SHIPPED | OUTPATIENT
Start: 2022-10-06

## 2022-10-06 NOTE — PATIENT INSTRUCTIONS
Check on insurance coverage for Shingrix. If covered, then ask your insurance if you should do it at the [address] [place] [address] office or pharmacy. We do currently have it available in our office.

## 2022-10-22 ENCOUNTER — PATIENT MESSAGE (OUTPATIENT)
Dept: FAMILY MEDICINE CLINIC | Facility: CLINIC | Age: 57
End: 2022-10-22

## 2022-10-24 LAB — HPV I/H RISK 1 DNA SPEC QL NAA+PROBE: NEGATIVE

## 2022-10-24 NOTE — TELEPHONE ENCOUNTER
Check with lab (I'm not sure if it was [place] OTR or THE [facility] OF [facility]).

## 2022-10-24 NOTE — TELEPHONE ENCOUNTER
From: Melissa Tate  To: Dimitrios Beckham PA-C  Sent: 10/22/2022 9:43 AM CDT  Subject: Test results never received     Dung Null,  Just checking on my pap results   Never got them  It has been couple of weeks now   Thank you very much

## 2022-10-26 ENCOUNTER — ORDER TRANSCRIPTION (OUTPATIENT)
Dept: ADMINISTRATIVE | Facility: HOSPITAL | Age: 57
End: 2022-10-26

## 2022-10-26 DIAGNOSIS — E78.00 HIGH CHOLESTEROL: Primary | ICD-10-CM

## 2022-11-16 ENCOUNTER — HOSPITAL ENCOUNTER (OUTPATIENT)
Dept: ULTRASOUND IMAGING | Age: 57
Discharge: HOME | End: 2022-11-16
Attending: FAMILY MEDICINE
Payer: COMMERCIAL

## 2022-11-16 DIAGNOSIS — R94.6 ABNORMAL THYROID EXAM: ICD-10-CM

## 2022-11-16 PROCEDURE — 76536 US EXAM OF HEAD AND NECK: CPT | Performed by: FAMILY MEDICINE

## 2022-11-21 ENCOUNTER — HOSPITAL ENCOUNTER (OUTPATIENT)
Dept: BONE DENSITY | Age: 57
Discharge: HOME | End: 2022-11-21
Attending: FAMILY MEDICINE
Payer: COMMERCIAL

## 2022-11-21 DIAGNOSIS — Z13.820 SCREENING FOR OSTEOPOROSIS: ICD-10-CM

## 2022-11-21 PROCEDURE — 77080 DXA BONE DENSITY AXIAL: CPT | Performed by: FAMILY MEDICINE

## 2022-11-29 ENCOUNTER — HOSPITAL ENCOUNTER (OUTPATIENT)
Dept: ULTRASOUND IMAGING | Age: 57
Discharge: HOME | End: 2022-11-29
Attending: FAMILY MEDICINE
Payer: COMMERCIAL

## 2022-11-29 DIAGNOSIS — K82.4 POLYP OF GALLBLADDER: ICD-10-CM

## 2022-11-29 PROCEDURE — 76700 US EXAM ABDOM COMPLETE: CPT | Performed by: FAMILY MEDICINE

## 2022-12-01 ENCOUNTER — HOSPITAL ENCOUNTER (OUTPATIENT)
Dept: CT IMAGING | Age: 57
Discharge: HOME | End: 2022-12-01
Attending: FAMILY MEDICINE

## 2022-12-01 DIAGNOSIS — E78.00 HIGH CHOLESTEROL: ICD-10-CM

## 2022-12-01 NOTE — PROGRESS NOTES
Date of Service 12/1/2022    Ramandeep Mooney  Date of Birth 9/1/1965    Patient Age: 56year old    PCP: Annalisa Urias MD  [address] [place] [address]    Consult Type  Type Scan/Screening: Heart Scan  Preliminary Heart Scan Score: 0                Body Mass Index  There is no height or weight on file to calculate BMI. Lipid Profile  Cholesterol: 244, done on 10/6/2022. HDL Cholesterol: 58, done on 10/6/2022. LDL Cholesterol: 164, done on 10/6/2022. TriGlycerides 121, done on 10/6/2022. Glucose 96 fasting, done on 10/6/2022    Nurse Review  Risk factor information and results reviewed with Nurse: Yes,     Recommended Follow Up:  Consult your physician regarding[de-identified] Final Heart Scan Report; Discuss potential for Incidental Finding    Free PV Screening offered to patient. To be scheduled for Carotid screening, recent ultrasound of abdomen completed      Recommendations for Change:  Nutrition Changes: Low Saturated Fat;Low Salt Eating; Low Fat Dairy; Increase Fiber   Less butter, cheese, saturated fats, trend toward Mediterranean diet    Cholesterol Modification (goal of therapy depends upon your risk): Decrease LDL (Lousy/Bad) Ideal <100;Decrease Total Normal <200     Exercise: No Change Needed     Weight Management: Decrease Current Weight     Repeat Heart Scan: 5 years if Calcium Score is 0. 0; Discuss with your Physician          Sumaya Recommended Resources:  Recommended Resources: [address] [place] [phone];[email or web address] Classes, Medical Services and Health Library www. [facility]. Israel Tran RN        Please Contact the Nurse Heart Line with any Questions or Concerns [phone].

## 2023-02-10 ENCOUNTER — WALK IN (OUTPATIENT)
Dept: URGENT CARE | Age: 58
End: 2023-02-10

## 2023-02-10 VITALS
SYSTOLIC BLOOD PRESSURE: 118 MMHG | HEIGHT: 66 IN | BODY MASS INDEX: 28.12 KG/M2 | WEIGHT: 175 LBS | OXYGEN SATURATION: 100 % | HEART RATE: 86 BPM | TEMPERATURE: 99.5 F | RESPIRATION RATE: 18 BRPM | DIASTOLIC BLOOD PRESSURE: 76 MMHG

## 2023-02-10 DIAGNOSIS — J02.0 PHARYNGITIS DUE TO STREPTOCOCCUS SPECIES: Primary | ICD-10-CM

## 2023-02-10 PROBLEM — N95.1 MENOPAUSAL SYMPTOMS: Status: ACTIVE | Noted: 2018-03-20

## 2023-02-10 LAB
INTERNAL PROCEDURAL CONTROLS ACCEPTABLE: YES
S PYO AG THROAT QL IA.RAPID: POSITIVE
TEST LOT EXPIRATION DATE: ABNORMAL
TEST LOT NUMBER: ABNORMAL

## 2023-02-10 PROCEDURE — 87880 STREP A ASSAY W/OPTIC: CPT | Performed by: NURSE PRACTITIONER

## 2023-02-10 PROCEDURE — 99213 OFFICE O/P EST LOW 20 MIN: CPT | Performed by: NURSE PRACTITIONER

## 2023-02-10 RX ORDER — AMOXICILLIN 500 MG/1
500 TABLET, FILM COATED ORAL 2 TIMES DAILY
Qty: 20 TABLET | Refills: 0 | Status: SHIPPED | OUTPATIENT
Start: 2023-02-10 | End: 2023-02-20

## 2023-02-10 RX ORDER — PROGESTERONE 200 MG/1
CAPSULE ORAL
COMMUNITY
Start: 2023-02-02

## 2023-02-10 RX ORDER — FLUOXETINE 10 MG/1
10 CAPSULE ORAL
COMMUNITY
Start: 2022-10-06 | End: 2023-02-10 | Stop reason: ALTCHOICE

## 2023-02-10 RX ORDER — MONTELUKAST SODIUM 10 MG/1
10 TABLET ORAL
COMMUNITY
Start: 2022-10-06 | End: 2023-10-01

## 2023-02-10 ASSESSMENT — ENCOUNTER SYMPTOMS
SINUS PRESSURE: 0
TROUBLE SWALLOWING: 0
SWOLLEN GLANDS: 0
WEAKNESS: 0
LIGHT-HEADEDNESS: 0
APPETITE CHANGE: 0
ABDOMINAL PAIN: 0
VOICE CHANGE: 0
CHOKING: 0
PSYCHIATRIC NEGATIVE: 1
VOMITING: 0
FATIGUE: 0
DIAPHORESIS: 0
FEVER: 0
COUGH: 0
SINUS PAIN: 0
SPEECH DIFFICULTY: 0
RHINORRHEA: 0
WHEEZING: 0
CHEST TIGHTNESS: 0
DIARRHEA: 0
SHORTNESS OF BREATH: 0
HEADACHES: 0
ACTIVITY CHANGE: 0
NAUSEA: 0
SORE THROAT: 1
DIZZINESS: 0
CHILLS: 0

## 2023-02-19 ENCOUNTER — TELEPHONE (OUTPATIENT)
Dept: FAMILY MEDICINE CLINIC | Facility: CLINIC | Age: 58
End: 2023-02-19

## 2023-02-19 RX ORDER — AZITHROMYCIN 250 MG/1
TABLET, FILM COATED ORAL
Qty: 6 TABLET | Refills: 0 | Status: SHIPPED | OUTPATIENT
Start: 2023-02-19 | End: 2023-02-24

## 2023-02-19 NOTE — TELEPHONE ENCOUNTER
Seen at a [place] [address] clinic on 2/10/23 for strep pharyngitis (tested positive on strep test per patient). Was given Amox 500mg BID x 10 days. Today is last day of antibiotic and still has sore throat. Temp 99 - no temps over 100. Although it appears that amox 500mg BID x 10 for strep pharyngitis is an option per Epoestela, advised patient I usually use a higher dose. She does well on Z pack previously. Sent Z pack to her [facility]. Advised that if any fever 100 or greater, worsening, symptoms, or if no better with Z pack should be re-evaluated. Patient verbalized understanding.

## 2023-11-18 ENCOUNTER — HOSPITAL ENCOUNTER (OUTPATIENT)
Dept: ULTRASOUND IMAGING | Age: 58
Discharge: HOME | End: 2023-11-18
Attending: FAMILY MEDICINE
Payer: COMMERCIAL

## 2023-11-18 DIAGNOSIS — E04.1 LEFT THYROID NODULE: ICD-10-CM

## 2023-11-18 PROCEDURE — 76536 US EXAM OF HEAD AND NECK: CPT | Performed by: FAMILY MEDICINE

## 2024-01-22 ENCOUNTER — OFFICE VISIT (OUTPATIENT)
Dept: FAMILY MEDICINE CLINIC | Facility: CLINIC | Age: 59
End: 2024-01-22
Payer: COMMERCIAL

## 2024-01-22 VITALS
BODY MASS INDEX: 26.2 KG/M2 | DIASTOLIC BLOOD PRESSURE: 68 MMHG | HEIGHT: 66 IN | WEIGHT: 163 LBS | SYSTOLIC BLOOD PRESSURE: 108 MMHG | TEMPERATURE: 98 F | RESPIRATION RATE: 16 BRPM | HEART RATE: 74 BPM

## 2024-01-22 DIAGNOSIS — Z00.00 ROUTINE GENERAL MEDICAL EXAMINATION AT A HEALTH CARE FACILITY: Primary | ICD-10-CM

## 2024-01-22 DIAGNOSIS — Z00.00 BLOOD TESTS FOR ROUTINE GENERAL PHYSICAL EXAMINATION: ICD-10-CM

## 2024-01-22 DIAGNOSIS — F41.9 ANXIETY: ICD-10-CM

## 2024-01-22 DIAGNOSIS — Z12.11 SCREENING FOR MALIGNANT NEOPLASM OF COLON: ICD-10-CM

## 2024-01-22 DIAGNOSIS — G47.00 INSOMNIA, UNSPECIFIED TYPE: ICD-10-CM

## 2024-01-22 DIAGNOSIS — Z12.31 ENCOUNTER FOR SCREENING MAMMOGRAM FOR MALIGNANT NEOPLASM OF BREAST: ICD-10-CM

## 2024-01-22 DIAGNOSIS — Z12.4 PAPANICOLAOU SMEAR FOR CERVICAL CANCER SCREENING: ICD-10-CM

## 2024-01-22 PROCEDURE — 88175 CYTOPATH C/V AUTO FLUID REDO: CPT | Performed by: FAMILY MEDICINE

## 2024-01-22 RX ORDER — FLUCONAZOLE 150 MG/1
TABLET ORAL
Qty: 2 TABLET | Refills: 0 | Status: SHIPPED | OUTPATIENT
Start: 2024-01-22

## 2024-01-22 RX ORDER — ZOLPIDEM TARTRATE 12.5 MG/1
TABLET, FILM COATED, EXTENDED RELEASE ORAL
Qty: 90 TABLET | Refills: 0 | Status: SHIPPED | OUTPATIENT
Start: 2024-01-22

## 2024-01-22 RX ORDER — CLONAZEPAM 0.5 MG/1
TABLET ORAL
Qty: 30 TABLET | Refills: 0 | Status: SHIPPED | OUTPATIENT
Start: 2024-01-22

## 2024-01-22 RX ORDER — PROGESTERONE 200 MG/1
200 CAPSULE ORAL NIGHTLY
COMMUNITY

## 2024-01-22 RX ORDER — CLINDAMYCIN PHOSPHATE 20 MG/G
CREAM VAGINAL
Qty: 40 G | Refills: 1 | Status: SHIPPED | OUTPATIENT
Start: 2024-01-22

## 2024-01-22 NOTE — PROGRESS NOTES
CHIEF COMPLAINT       Annual Physical Exam  HPI:   Nina Payton is a 58 year old female who presents for a complete physical exam.   Last Pap 2022 - normal - negative HPV.   Hx of HPV in the past - doesn't remember when was last noted.   Seeing a different provider for hormone replacement therapy. Using bioidentical hormones.       Stopped fluoxetine - doesn't want to take daily medication anymore.  Does have some anxiety a couple days per week.  Would like medication to use.  Also has some insomnia - uses Ambien infrequently.  Has used clonazepam in the past for episodic anxiety.     Wt Readings from Last 6 Encounters:   01/22/24 163 lb (73.9 kg)   10/06/22 181 lb 3.2 oz (82.2 kg)   08/12/22 177 lb (80.3 kg)   07/19/21 168 lb (76.2 kg)   06/23/21 169 lb (76.7 kg)   12/10/20 166 lb (75.3 kg)     Body mass index is 26.31 kg/m².     Cholesterol, Total (mg/dL)   Date Value   10/06/2022 244 (H)     CHOLESTEROL, TOTAL (mg/dL)   Date Value   12/11/2020 229 (H)   04/17/2019 208 (H)   03/01/2018 172     HDL Cholesterol (mg/dL)   Date Value   10/06/2022 58     HDL CHOLESTEROL (mg/dL)   Date Value   12/11/2020 60   04/17/2019 59   03/01/2018 60     LDL Cholesterol (mg/dL)   Date Value   10/06/2022 164 (H)     LDL-CHOLESTEROL (mg/dL (calc))   Date Value   12/11/2020 146 (H)   04/17/2019 118 (H)   03/01/2018 88     AST (U/L)   Date Value   10/06/2022 22   12/11/2020 29   04/17/2019 20   03/01/2018 15     ALT (U/L)   Date Value   10/06/2022 29   12/11/2020 35 (H)   04/17/2019 12   03/01/2018 10        Current Outpatient Medications   Medication Sig Dispense Refill    progesterone 200 MG Oral Cap Take 1 capsule (200 mg total) by mouth nightly. TAKE AT BEDTIME      fluconazole (DIFLUCAN) 150 MG Oral Tab 1 po once.  Repeat once after 3 days if needed. 2 tablet 0    Zolpidem Tartrate ER 12.5 MG Oral Tab CR 1 po at HS prn. No alcohol. No driving. 90 tablet 0    clindamycin 2 % Vaginal Cream Insert 1 applicatorful vaginally at HS x 3  nights. 40 g 1    clonazePAM 0.5 MG Oral Tab 1/2 - 1 po once daily prn. No driving and no alcohol.  Don't take with Ambien. 30 tablet 0    nitrofurantoin monohydrate macro 100 MG Oral Cap TAKE ONE CAPSULE BY MOUTH AFTER INTERCOURSE AS NEEDED 90 capsule 0      No Known Allergies   Past Medical History:   Diagnosis Date    Anxiety state, unspecified     Chest pain, unspecified 12/09    General counseling for prescription of oral contraceptives     Microscopic hematuria     Migraine, unspecified, without mention of intractable migraine without mention of status migrainosus     Other B-complex deficiencies     Other screening mammogram     Personal history of urinary (tract) infection     Routine Papanicolaou smear       Past Surgical History:   Procedure Laterality Date    BREAST SURGERY PROCEDURE UNLISTED  1996    Right Breast Lumpectomy, benign    ABIDA LOCALIZATION WIRE 1 SITE RIGHT (CPT=19281)      1993 BENIGN.       Family History   Problem Relation Age of Onset    Diabetes Mother     Stroke Mother     Breast Cancer Paternal Cousin Female 44    Cancer Father       Social History:   Social History     Socioeconomic History    Marital status:    Tobacco Use    Smoking status: Never    Smokeless tobacco: Never   Vaping Use    Vaping Use: Former   Substance and Sexual Activity    Alcohol use: Yes     Alcohol/week: 0.0 standard drinks of alcohol     Comment: occas/ couples glasses wine every other day    Drug use: Yes     Types: Cannabis   Other Topics Concern    Caffeine Concern Yes     Comment: coffee q am    Exercise Yes     Comment: cardio, wts 3 x q week          REVIEW OF SYSTEMS:   GENERAL: feels well otherwise  SKIN: no complaint of any unusual skin lesions  EYES: no complaint of blurred vision or double vision  HEENT: no complaint of nasal congestion, sinus pain or ST  LUNGS: no complaint of shortness of breath with exertion  CARDIOVASCULAR: no complaint of chest pain on exertion  GI: no complaint of  pain,denies heartburn  : No complains of dysuria or vaginal discharge  MUSCULOSKELETAL: no complaint of back pain  NEURO: no complaint of headaches  PSYCHE: as above    EXAM:   /68   Pulse 74   Temp 98.2 °F (36.8 °C) (Oral)   Resp 16   Ht 5' 6\" (1.676 m)   Wt 163 lb (73.9 kg)   BMI 26.31 kg/m²   Body mass index is 26.31 kg/m².   GENERAL: well developed, well nourished,in no apparent distress  SKIN: no rashes,no suspicious lesions  HEENT: atraumatic, normocephalic,ears and throat are clear  EYES:EOMI, conjunctiva are clear  NECK: supple,no adenopathy, thyroid is prominent  BREAST: no dominant or suspicious mass, no nipple discharge  LUNGS: clear to auscultation  CARDIO: RRR without murmur  GI: no masses, HSM or tenderness  :introitus is normal, some thick, white clumpy discharge, cervix is pink, no adnexal masses or tenderness, PAP was done.  MUSCULOSKELETAL: No obvious joint swelling or deformity.   EXTREMITIES: no cyanosis, clubbing or edema  NEURO: Oriented times three,cranial nerves are intact,motor and sensory are grossly intact  PSYCH: normal affect. Normal mood.    ASSESSMENT AND PLAN:   Nina Payton is a 58 year old female who presents for a complete physical exam.   Pt' s weight is Body mass index is 26.31 kg/m².. Recommend regular exercise. The patient indicates understanding of these issues and agrees to the plan.  Encounter Diagnoses   Name Primary?    Encounter for screening mammogram for malignant neoplasm of breast     Screening for malignant neoplasm of colon     Routine general medical examination at a health care facility Yes    Blood tests for routine general physical examination     Insomnia, unspecified type     Anxiety     Papanicolaou smear for cervical cancer screening        Orders Placed This Encounter   Procedures    CBC With Differential With Platelet    Comp Metabolic Panel (14)    Lipid Panel    TSH W Reflex To Free T4    ThinPrep PAP with HPV Reflex Request       Meds &  Refills for this Visit:  Requested Prescriptions     Signed Prescriptions Disp Refills    fluconazole (DIFLUCAN) 150 MG Oral Tab 2 tablet 0     Si po once.  Repeat once after 3 days if needed.    Zolpidem Tartrate ER 12.5 MG Oral Tab CR 90 tablet 0     Si po at HS prn. No alcohol. No driving.    clindamycin 2 % Vaginal Cream 40 g 1     Sig: Insert 1 applicatorful vaginally at HS x 3 nights.    clonazePAM 0.5 MG Oral Tab 30 tablet 0     Si/2 - 1 po once daily prn. No driving and no alcohol.  Don't take with Ambien.       Imaging & Consults:  GASTRO - INTERNAL  ABIDA FANNIE 2D+3D SCREENING BILAT (CPT=77067/68680)    Follow up 1 year and prn.  Follow up 6 months if refill on clonazepam or zolpidem.  Discussed potential for tolerance/dependence with clonazepam. Patient aware she shouldn't drive if taking clonazepam or zolpidem.

## 2024-01-22 NOTE — PATIENT INSTRUCTIONS
Check on insurance coverage for Shingrix.  If covered, then ask your insurance if you should do it at the doctor's office or pharmacy.  We do currently have it available in our office.     Check on insurance coverage for Tdap (tetanus, diptheria, pertussis).  If covered, then ask your insurance if you should do it at the doctor's office or pharmacy.  We do currently have it available in our office.     Follow up 6 months on Ambien and clonazepam - don't take these together.

## 2024-01-25 LAB
.: NORMAL
.: NORMAL

## 2024-06-24 ENCOUNTER — OFFICE VISIT (OUTPATIENT)
Dept: FAMILY MEDICINE CLINIC | Facility: CLINIC | Age: 59
End: 2024-06-24

## 2024-06-24 VITALS
TEMPERATURE: 97 F | DIASTOLIC BLOOD PRESSURE: 78 MMHG | BODY MASS INDEX: 26.03 KG/M2 | HEIGHT: 66 IN | RESPIRATION RATE: 16 BRPM | SYSTOLIC BLOOD PRESSURE: 138 MMHG | HEART RATE: 72 BPM | WEIGHT: 162 LBS

## 2024-06-24 DIAGNOSIS — G47.00 INSOMNIA, UNSPECIFIED TYPE: Primary | ICD-10-CM

## 2024-06-24 DIAGNOSIS — F41.9 ANXIETY: ICD-10-CM

## 2024-06-24 PROCEDURE — 3078F DIAST BP <80 MM HG: CPT | Performed by: FAMILY MEDICINE

## 2024-06-24 PROCEDURE — 3008F BODY MASS INDEX DOCD: CPT | Performed by: FAMILY MEDICINE

## 2024-06-24 PROCEDURE — 3075F SYST BP GE 130 - 139MM HG: CPT | Performed by: FAMILY MEDICINE

## 2024-06-24 PROCEDURE — 99214 OFFICE O/P EST MOD 30 MIN: CPT | Performed by: FAMILY MEDICINE

## 2024-06-24 RX ORDER — ESZOPICLONE 2 MG/1
TABLET, FILM COATED ORAL
Qty: 30 TABLET | Refills: 0 | Status: SHIPPED | OUTPATIENT
Start: 2024-06-24

## 2024-06-24 RX ORDER — FLUOXETINE HYDROCHLORIDE 20 MG/1
20 CAPSULE ORAL DAILY
Qty: 90 CAPSULE | Refills: 0 | Status: SHIPPED | OUTPATIENT
Start: 2024-06-24

## 2024-06-24 RX ORDER — TESTOSTERONE 50 MG/5G
GEL TRANSDERMAL
COMMUNITY
Start: 2022-04-23

## 2024-06-24 RX ORDER — FLUOXETINE 10 MG/1
CAPSULE ORAL
COMMUNITY
Start: 2024-05-16 | End: 2024-06-24

## 2024-06-24 NOTE — PROGRESS NOTES
Nina Payton is a 58 year old female.  CHIEF COMPLAINT   Follow up on Prozac.   HPI:   Here to follow up on Prozac.  Started taking again in May. Had previously taken during menopause.  Two years ago stopped fluoxetine and intially did well.  About a year ago started feeling panic attacks - \"panic from within\". Didn't have a reason for the anxiety.   Was also extremely irritable. Feels it is helping her anxiety but still not sleeping well. Currently taking 10 mg of fluoxetine. Restarted 5/16.      Can sleep only 4-6 hours with Ambien CR.  Has also used the non-extended relief ambien in the middle of the night.      Current Outpatient Medications   Medication Sig Dispense Refill    FLUoxetine 10 MG Oral Cap       Testosterone 2 % Transdermal Cream       progesterone 200 MG Oral Cap Take 1 capsule (200 mg total) by mouth nightly. TAKE AT BEDTIME      fluconazole (DIFLUCAN) 150 MG Oral Tab 1 po once.  Repeat once after 3 days if needed. 2 tablet 0    Zolpidem Tartrate ER 12.5 MG Oral Tab CR 1 po at HS prn. No alcohol. No driving. 90 tablet 0    clindamycin 2 % Vaginal Cream Insert 1 applicatorful vaginally at HS x 3 nights. 40 g 1    clonazePAM 0.5 MG Oral Tab 1/2 - 1 po once daily prn. No driving and no alcohol.  Don't take with Ambien. 30 tablet 0    nitrofurantoin monohydrate macro 100 MG Oral Cap TAKE ONE CAPSULE BY MOUTH AFTER INTERCOURSE AS NEEDED 90 capsule 0      Past Medical History:    Anxiety state, unspecified    Chest pain, unspecified    General counseling for prescription of oral contraceptives    Microscopic hematuria    Migraine, unspecified, without mention of intractable migraine without mention of status migrainosus    Other B-complex deficiencies    Other screening mammogram    Personal history of urinary (tract) infection    Routine Papanicolaou smear      Social History:  Social History     Socioeconomic History    Marital status:    Tobacco Use    Smoking status: Never    Smokeless  tobacco: Never   Vaping Use    Vaping status: Former   Substance and Sexual Activity    Alcohol use: Yes     Alcohol/week: 0.0 standard drinks of alcohol     Comment: occas/ couples glasses wine every other day    Drug use: Yes     Types: Cannabis   Other Topics Concern    Caffeine Concern Yes     Comment: coffee q am    Exercise Yes     Comment: cardio, wts 3 x q week        REVIEW OF SYSTEMS:   GENERAL HEALTH: feels well otherwise    EXAM:   /78 (BP Location: Right arm, Patient Position: Sitting, Cuff Size: adult)   Pulse 72   Temp 97.1 °F (36.2 °C) (Temporal)   Resp 16   Ht 5' 6\" (1.676 m)   Wt 162 lb (73.5 kg)   BMI 26.15 kg/m²   GENERAL: well developed, well nourished,in no apparent distress  SKIN: no rashes,no suspicious lesions  PSYCH:  normal affect. Normal mood    ASSESSMENT AND PLAN:     Encounter Diagnoses   Name Primary?    Insomnia, unspecified type Yes    Anxiety        No orders of the defined types were placed in this encounter.      Meds & Refills for this Visit:  Requested Prescriptions     Signed Prescriptions Disp Refills    FLUoxetine 20 MG Oral Cap 90 capsule 0     Sig: Take 1 capsule (20 mg total) by mouth daily.    eszopiclone 2 MG Oral Tab 30 tablet 0     Si po at HS.  No driving and no alcohol.       [facility]:  [facility]    The patient indicates understanding of these issues and agrees to the plan.  Patient Instructions   Increase dose to fluoxetine 20mg daily to see if this helps with the insomnia.     Stop the Ambien.     Start the Lunesta. Update after a trial of the Lunesta.      See Dr. Naomie Chirinos for future visits.      The  will call you to discuss options.

## 2024-07-02 ENCOUNTER — PATIENT MESSAGE (OUTPATIENT)
Dept: FAMILY MEDICINE CLINIC | Facility: CLINIC | Age: 59
End: 2024-07-02

## 2024-07-02 RX ORDER — FLUOXETINE 10 MG/1
10 CAPSULE ORAL DAILY
Qty: 90 CAPSULE | Refills: 1 | Status: SHIPPED | OUTPATIENT
Start: 2024-07-02

## 2024-07-02 NOTE — TELEPHONE ENCOUNTER
Prescription sent to pharmacy.    Attempted to call patient to notify that Rx was sent to pharmacy. No answer, left a detailed voice message to identifiable voice mail.    Also notified patient about updated prescription via [employer] message.

## 2024-07-02 NOTE — TELEPHONE ENCOUNTER
From: Nina Payton  To: Stephanie Dressler  Sent: 7/2/2024 6:46 AM CDT  Subject: Fluoxetine dosage     Hi Farheen,  I think I need to go back to a smaller dose for fluoxetine because I feel like higher dose is making me way too tired and sleepy and i feel like a zombie a little   I can sit still on a couch for hours and do nothing   I don’t think I like that   It’s making me calm ,but way too calm   Please give me a new prescription for the old dose   10 mg   Thank u fabien much    Nina Payton

## 2024-07-13 ENCOUNTER — TELEPHONE (OUTPATIENT)
Dept: FAMILY MEDICINE CLINIC | Facility: CLINIC | Age: 59
End: 2024-07-13

## 2024-07-15 RX ORDER — PROGESTERONE 200 MG/1
200 CAPSULE ORAL NIGHTLY
Refills: 0 | OUTPATIENT
Start: 2024-07-15

## 2024-07-15 NOTE — TELEPHONE ENCOUNTER
Did not pass protocol  Last office visit  1/22/2024  Last refill was: , __tabs  Next appointment:     Please sign pended medication if appropriate       Medication Quantity Refills Start End   progesterone 200 MG Oral Cap -- --  --   Sig:   Take 1 capsule (200 mg total) by mouth nightly. TAKE AT BEDTIME     Route:   Oral

## 2024-10-18 NOTE — PATIENT INSTRUCTIONS
Increase dose to fluoxetine 20mg daily to see if this helps with the insomnia.     Stop the Ambien.     Start the Lunesta. Update after a trial of the Lunesta.      See Dr. Naomie Chirinos for future visits.      The  will call you to discuss options.    
Patient
Yes

## 2025-02-19 RX ORDER — PROGESTERONE 200 MG/1
200 CAPSULE ORAL NIGHTLY
Refills: 0 | Status: CANCELLED | OUTPATIENT
Start: 2025-02-19

## 2025-02-20 ENCOUNTER — TELEPHONE (OUTPATIENT)
Dept: FAMILY MEDICINE CLINIC | Facility: CLINIC | Age: 60
End: 2025-02-20

## 2025-02-20 DIAGNOSIS — G47.00 INSOMNIA, UNSPECIFIED TYPE: ICD-10-CM

## 2025-02-20 RX ORDER — FLUOXETINE 10 MG/1
10 CAPSULE ORAL DAILY
Qty: 90 CAPSULE | Refills: 0 | Status: CANCELLED | OUTPATIENT
Start: 2025-02-20

## 2025-02-20 RX ORDER — ESZOPICLONE 2 MG/1
TABLET, FILM COATED ORAL
Qty: 30 TABLET | Refills: 0 | Status: CANCELLED | OUTPATIENT
Start: 2025-02-20

## 2025-02-20 NOTE — TELEPHONE ENCOUNTER
Rx refill  FLUoxetine 10 MG Oral Cap   eszopiclone 2 MG Oral Tab     Send to   [id number] - [place], [place] - [facility] [address] [address], [phone], [phone]     Patient did schedule med check in May.

## 2025-02-20 NOTE — TELEPHONE ENCOUNTER
Previous Stephanie Dressler, PA patient    LOV: 1/22/24    NOV: 5/19/25    Requesting refill: fluoxetine 10mg - last refill: 7/2/24 #90 with 1 refill                              eszopiclone 2 MG - last refill: 6/24/24 #30       Please see pended Rx for your approval. Thank you.

## 2025-02-24 NOTE — TELEPHONE ENCOUNTER
Patient changed PCP to Dr. Zuleta. Informed patient that Dr. Zuleta will be over her care going forward. Patient Schedule med check for 2/27 Patient understood and agreed.

## 2025-02-27 ENCOUNTER — LAB ENCOUNTER (OUTPATIENT)
Dept: LAB | Age: 60
End: 2025-02-27
Attending: FAMILY MEDICINE
Payer: COMMERCIAL

## 2025-02-27 ENCOUNTER — OFFICE VISIT (OUTPATIENT)
Dept: FAMILY MEDICINE CLINIC | Facility: CLINIC | Age: 60
End: 2025-02-27
Payer: COMMERCIAL

## 2025-02-27 ENCOUNTER — HOSPITAL ENCOUNTER (OUTPATIENT)
Dept: GENERAL RADIOLOGY | Age: 60
Discharge: HOME | End: 2025-02-27
Attending: FAMILY MEDICINE
Payer: COMMERCIAL

## 2025-02-27 VITALS
SYSTOLIC BLOOD PRESSURE: 114 MMHG | RESPIRATION RATE: 16 BRPM | TEMPERATURE: 98 F | HEIGHT: 66 IN | HEART RATE: 78 BPM | WEIGHT: 172 LBS | BODY MASS INDEX: 27.64 KG/M2 | DIASTOLIC BLOOD PRESSURE: 80 MMHG

## 2025-02-27 DIAGNOSIS — Z12.11 SCREEN FOR COLON CANCER: ICD-10-CM

## 2025-02-27 DIAGNOSIS — R05.1 ACUTE COUGH: ICD-10-CM

## 2025-02-27 DIAGNOSIS — Z12.31 VISIT FOR SCREENING MAMMOGRAM: ICD-10-CM

## 2025-02-27 DIAGNOSIS — N39.0 CHRONIC UTI (URINARY TRACT INFECTION): ICD-10-CM

## 2025-02-27 DIAGNOSIS — Z00.00 ROUTINE GENERAL MEDICAL EXAMINATION AT A HEALTH CARE FACILITY: Primary | ICD-10-CM

## 2025-02-27 DIAGNOSIS — Z23 ENCOUNTER FOR IMMUNIZATION: ICD-10-CM

## 2025-02-27 DIAGNOSIS — Z00.00 HEALTHCARE MAINTENANCE: ICD-10-CM

## 2025-02-27 DIAGNOSIS — F41.9 ANXIETY: ICD-10-CM

## 2025-02-27 LAB
ALBUMIN SERPL-MCNC: 5 G/DL (ref 3.2–4.8)
ALBUMIN/GLOB SERPL: 1.5 {RATIO} (ref 1–2)
ALP LIVER SERPL-CCNC: 53 U/L
ALT SERPL-CCNC: 20 U/L
ANION GAP SERPL CALC-SCNC: 6 MMOL/L (ref 0–18)
AST SERPL-CCNC: 27 U/L (ref ?–34)
BASOPHILS # BLD AUTO: 0.06 X10(3) UL (ref 0–0.2)
BASOPHILS NFR BLD AUTO: 0.7 %
BILIRUB SERPL-MCNC: 1.1 MG/DL (ref 0.3–1.2)
BUN BLD-MCNC: 16 MG/DL (ref 9–23)
CALCIUM BLD-MCNC: 9.4 MG/DL (ref 8.7–10.6)
CHLORIDE SERPL-SCNC: 101 MMOL/L (ref 98–112)
CHOLEST SERPL-MCNC: 276 MG/DL (ref ?–200)
CO2 SERPL-SCNC: 30 MMOL/L (ref 21–32)
CREAT BLD-MCNC: 0.95 MG/DL
EGFRCR SERPLBLD CKD-EPI 2021: 70 ML/MIN/1.73M2 (ref 60–?)
EOSINOPHIL # BLD AUTO: 1 X10(3) UL (ref 0–0.7)
EOSINOPHIL NFR BLD AUTO: 10.9 %
ERYTHROCYTE [DISTWIDTH] IN BLOOD BY AUTOMATED COUNT: 12 %
ESTRADIOL SERPL-MCNC: 55 PG/ML
FASTING PATIENT LIPID ANSWER: YES
FASTING STATUS PATIENT QL REPORTED: YES
FSH SERPL-ACNC: 63 MIU/ML
GLOBULIN PLAS-MCNC: 3.3 G/DL (ref 2–3.5)
GLUCOSE BLD-MCNC: 87 MG/DL (ref 70–99)
HCT VFR BLD AUTO: 45.5 %
HDLC SERPL-MCNC: 56 MG/DL (ref 40–59)
HGB BLD-MCNC: 15.2 G/DL
IMM GRANULOCYTES # BLD AUTO: 0.01 X10(3) UL (ref 0–1)
IMM GRANULOCYTES NFR BLD: 0.1 %
LDLC SERPL CALC-MCNC: 206 MG/DL (ref ?–100)
LH SERPL-ACNC: 37.3 MIU/ML
LYMPHOCYTES # BLD AUTO: 3.78 X10(3) UL (ref 1–4)
LYMPHOCYTES NFR BLD AUTO: 41.1 %
MCH RBC QN AUTO: 32.3 PG (ref 26–34)
MCHC RBC AUTO-ENTMCNC: 33.4 G/DL (ref 31–37)
MCV RBC AUTO: 96.8 FL
MONOCYTES # BLD AUTO: 0.63 X10(3) UL (ref 0.1–1)
MONOCYTES NFR BLD AUTO: 6.9 %
NEUTROPHILS # BLD AUTO: 3.71 X10 (3) UL (ref 1.5–7.7)
NEUTROPHILS # BLD AUTO: 3.71 X10(3) UL (ref 1.5–7.7)
NEUTROPHILS NFR BLD AUTO: 40.3 %
NONHDLC SERPL-MCNC: 220 MG/DL (ref ?–130)
OSMOLALITY SERPL CALC.SUM OF ELEC: 285 MOSM/KG (ref 275–295)
PLATELET # BLD AUTO: 288 10(3)UL (ref 150–450)
POTASSIUM SERPL-SCNC: 3.9 MMOL/L (ref 3.5–5.1)
PROLACTIN SERPL-MCNC: 8.1 NG/ML
PROT SERPL-MCNC: 8.3 G/DL (ref 5.7–8.2)
RBC # BLD AUTO: 4.7 X10(6)UL
SODIUM SERPL-SCNC: 137 MMOL/L (ref 136–145)
TRIGL SERPL-MCNC: 83 MG/DL (ref 30–149)
TSI SER-ACNC: 2.06 UIU/ML (ref 0.55–4.78)
VLDLC SERPL CALC-MCNC: 18 MG/DL (ref 0–30)
WBC # BLD AUTO: 9.2 X10(3) UL (ref 4–11)

## 2025-02-27 PROCEDURE — 3008F BODY MASS INDEX DOCD: CPT | Performed by: FAMILY MEDICINE

## 2025-02-27 PROCEDURE — 3074F SYST BP LT 130 MM HG: CPT | Performed by: FAMILY MEDICINE

## 2025-02-27 PROCEDURE — 84146 ASSAY OF PROLACTIN: CPT | Performed by: FAMILY MEDICINE

## 2025-02-27 PROCEDURE — 84410 TESTOSTERONE BIOAVAILABLE: CPT

## 2025-02-27 PROCEDURE — 82670 ASSAY OF TOTAL ESTRADIOL: CPT | Performed by: FAMILY MEDICINE

## 2025-02-27 PROCEDURE — 3079F DIAST BP 80-89 MM HG: CPT | Performed by: FAMILY MEDICINE

## 2025-02-27 PROCEDURE — 83001 ASSAY OF GONADOTROPIN (FSH): CPT | Performed by: FAMILY MEDICINE

## 2025-02-27 PROCEDURE — 80050 GENERAL HEALTH PANEL: CPT | Performed by: FAMILY MEDICINE

## 2025-02-27 PROCEDURE — 99214 OFFICE O/P EST MOD 30 MIN: CPT | Performed by: FAMILY MEDICINE

## 2025-02-27 PROCEDURE — 80061 LIPID PANEL: CPT | Performed by: FAMILY MEDICINE

## 2025-02-27 PROCEDURE — 83002 ASSAY OF GONADOTROPIN (LH): CPT | Performed by: FAMILY MEDICINE

## 2025-02-27 PROCEDURE — 71046 X-RAY EXAM CHEST 2 VIEWS: CPT | Performed by: FAMILY MEDICINE

## 2025-02-27 RX ORDER — NITROFURANTOIN 25; 75 MG/1; MG/1
CAPSULE ORAL
Qty: 90 CAPSULE | Refills: 0 | Status: SHIPPED | OUTPATIENT
Start: 2025-02-27

## 2025-02-27 RX ORDER — PREDNISONE 50 MG/1
50 TABLET ORAL DAILY
Qty: 5 TABLET | Refills: 0 | Status: SHIPPED | OUTPATIENT
Start: 2025-02-27 | End: 2025-03-04

## 2025-02-27 RX ORDER — FLUOXETINE 10 MG/1
10 CAPSULE ORAL DAILY
Qty: 90 CAPSULE | Refills: 1 | Status: SHIPPED | OUTPATIENT
Start: 2025-02-27

## 2025-02-27 RX ORDER — CLINDAMYCIN PHOSPHATE 20 MG/G
CREAM VAGINAL
Qty: 40 G | Refills: 1 | Status: SHIPPED | OUTPATIENT
Start: 2025-02-27

## 2025-02-27 RX ORDER — FLUCONAZOLE 150 MG/1
TABLET ORAL
Qty: 2 TABLET | Refills: 0 | Status: SHIPPED | OUTPATIENT
Start: 2025-02-27

## 2025-02-27 RX ORDER — TESTOSTERONE
POWDER (GRAM) MISCELLANEOUS
COMMUNITY
Start: 2024-09-19

## 2025-02-27 NOTE — PROGRESS NOTES
[facility] Family [facility] Office Note  Chief Complaint:   Chief Complaint   Patient presents with    Medication Follow-Up       HPI:   This is a 59 year old female coming in for medication follow-up.  The patient states that she was getting preventative medications to help with chronic urinary infections as well as yeast infections.  She does have a history of anxiety states that she has been doing well with her Prozac.  She is following up with a hormone specialist at [facility] that does bioidentical treatments for her.      Past Medical History:    Anxiety state, unspecified    Chest pain, unspecified    General counseling for prescription of oral contraceptives    Microscopic hematuria    Migraine, unspecified, without mention of intractable migraine without mention of status migrainosus    Other B-complex deficiencies    Other screening mammogram    Personal history of urinary (tract) infection    Routine Papanicolaou smear     Past Surgical History:   Procedure Laterality Date    Breast surgery procedure unlisted  1996    Right Breast Lumpectomy, benign    Lionel localization wire 1 site right (cpt=19281)      1993 BENIGN.      Social History:  Social History     Socioeconomic History    Marital status:    Tobacco Use    Smoking status: Never    Smokeless tobacco: Never   Vaping Use    Vaping status: Former   Substance and Sexual Activity    Alcohol use: Yes     Alcohol/week: 0.0 standard drinks of alcohol     Comment: occas/ couples glasses wine every other day    Drug use: Yes     Types: Cannabis   Other Topics Concern    Caffeine Concern Yes     Comment: coffee q am    Exercise Yes     Comment: cardio, wts 3 x q week     Family History:  Family History   Problem Relation Age of Onset    Diabetes Mother     Stroke Mother     Breast Cancer Paternal Cousin Female 44    Cancer Father      Allergies:  Allergies[1]  Current Meds:  Current Outpatient Medications   Medication Sig Dispense Refill     Testosterone Does not apply Powder       clindamycin 2 % Vaginal Cream Insert 1 applicatorful vaginally at HS x 3 nights. 40 g 1    fluconazole (DIFLUCAN) 150 MG Oral Tab 1 po once.  Repeat once after 3 days if needed. 2 tablet 0    FLUoxetine 10 MG Oral Cap Take 1 capsule (10 mg total) by mouth daily. 90 capsule 1    nitrofurantoin monohydrate macro 100 MG Oral Cap TAKE ONE CAPSULE BY MOUTH AFTER INTERCOURSE AS NEEDED 90 capsule 0    progesterone 200 MG Oral Cap Take 1 capsule (200 mg total) by mouth nightly. TAKE AT BEDTIME        Counseling given: Not Answered       REVIEW OF SYSTEMS:   Consitutional: No fevers, chills, sweats  Eye: No recent visual problems  ENMT: No ear pain nasal congestion sore throat  Respiratory: No shortness of breath, cough  Cardiovascular: No chest pain palpitations syncope  Gastrointestinal: No nausea vomiting diarrhea  Genitourinary: No hematuria  Hema/Lymph no bruising tendency, swollen lymph glands  Endocrine: Negative for excessive thirst excessive hunger  Musculoskeletal: No back pain neck pain joint pain muscle pain decreased range of motion       Medical, surgical, family, and social histories were reviewed      EXAM:   VITALS:   Vitals:    02/27/25 1002   BP: 114/80   Pulse: 78   Resp: 16   Temp: 98.1 °F (36.7 °C)      GENERAL: well developed, well nourished, in no apparent distress  SKIN: no rashes, no suspicious lesions: Cool and Dry  HEENT: atraumatic, normocephalic, ears and throat are clear    Ears: TM's clear and visible bilaterally, no excess cerumen or erythema.   EYES: Pupils equal round and reactive.  Extraocular motions intact no scleral icterus no injection or drainage  THROAT without erythema tonsillar hypertrophy or exudate.  Uvula midline airway patent  NECK: Given midline.  No JVD or lymphadenopathy supple nontender no meningeal signs   LUNGS: clear to auscultation sounds equal bilaterally positive wheezing bilateral lower lung fields  CARDIO: Regular rate and  rhythm without murmurs gallops or rubs          ASSESSMENT AND PLAN:   1. Healthcare maintenance  - CBC With Differential With Platelet  - Comp Metabolic Panel (14)  - Lipid Panel  - TSH W Reflex To Free T4  - FSH  - LH (Luteinizing Hormone)  - Estradiol [E]  - Prolactin [E]  I did discuss with the patient at this time we will go ahead and update blood work she need a CBC CMP free T4 free T3 TSH did discuss that she needs to update her mammogram.  Will also be updating a tetanus vaccine today.  I did discuss that we will be referring her to gastroenterology for screening colonoscopy.  2. Acute cough  - XR CHEST PA + LAT CHEST (CPT=71046); Future  I did discuss with the patient I am hearing some wheezing I did ask her to have a chest x-ray completed there was bronchitis found.   I did place a message to the patient that we could initiate prednisone if she has any increased coughing  3. Visit for screening mammogram  - 3D Mammogram Digital Screen, Bilateral (CPT=77067/64387); Future    4. Encounter for immunization  - TETANUS, DIPHTHERIA TOXOIDS AND ACELLULAR PERTUSIS VACCINE (TDAP), >7 YEARS, IM USE    5. Chronic UTI (urinary tract infection)  - nitrofurantoin monohydrate macro 100 MG Oral Cap; TAKE ONE CAPSULE BY MOUTH AFTER INTERCOURSE AS NEEDED  Dispense: 90 capsule; Refill: 0  Did discuss with the patient we will go ahead and continue with the Macrobid she was given refills of this today was asked to use it as needed  6. Anxiety  - FLUoxetine 10 MG Oral Cap; Take 1 capsule (10 mg total) by mouth daily.  Dispense: 90 capsule; Refill: 1  Patient is currently on Prozac she was asked to continue with the medication as prescribed.  7. Screen for colon cancer  - Gastro Referral - In Network       Meds & Refills for this Visit:  Requested Prescriptions     Signed Prescriptions Disp Refills    clindamycin 2 % Vaginal Cream 40 g 1     Sig: Insert 1 applicatorful vaginally at HS x 3 nights.    fluconazole (DIFLUCAN) 150 MG  Oral Tab 2 tablet 0     Si po once.  Repeat once after 3 days if needed.    FLUoxetine 10 MG Oral Cap 90 capsule 1     Sig: Take 1 capsule (10 mg total) by mouth daily.    nitrofurantoin monohydrate macro 100 MG Oral Cap 90 capsule 0     Sig: TAKE ONE CAPSULE BY MOUTH AFTER INTERCOURSE AS NEEDED       Health Maintenance:  Health Maintenance Due   Topic Date Due    DTaP,Tdap,and Td Vaccines (1 - Tdap) Never done    Pneumococcal Vaccine: 50+ Years (1 of 1 - PCV) Never done    Zoster Vaccines (1 of 2) Never done    Mammogram  10/03/2023    Colorectal Cancer Screening  2024    COVID-19 Vaccine ( season) 2024    Influenza Vaccine (1) 10/01/2024    Annual Depression Screening  2025    Annual Physical  2025       Patient/Caregiver Education: Patient/Caregiver Education: There are no barriers to learning. Medical education done.   Outcome: Patient verbalizes understanding. Patient is notified to call with any questions, complications, allergies, or worsening or changing symptoms.  Patient is to call with any side effects or complications from the treatments as a result of today.     Problem List:  Patient Active Problem List   Diagnosis    Other B-complex deficiencies    Anxiety    Insomnia, unspecified    Other acne    Gallbladder polyp    Microscopic hematuria    HPV (human papilloma virus) infection    Menopausal symptoms    Mood changes    Colon polyp    Diverticula, colon    Internal hemorrhoids    Irregular Z line of esophagus    Symptoms of gastroesophageal reflux         No follow-ups on file.     Kwaku Zuleta MD    Please note that portions of this note may have been completed with a voice recognition program. Efforts were made to edit the dictations but occasionally words are mis-transcribed.       [1] No Known Allergies

## 2025-02-28 DIAGNOSIS — E78.5 HYPERLIPIDEMIA, UNSPECIFIED HYPERLIPIDEMIA TYPE: Primary | ICD-10-CM

## 2025-02-28 RX ORDER — ATORVASTATIN CALCIUM 40 MG/1
40 TABLET, FILM COATED ORAL NIGHTLY
Qty: 90 TABLET | Refills: 0 | Status: SHIPPED | OUTPATIENT
Start: 2025-02-28

## 2025-03-06 LAB
SEX HORM BIND GLOB: 67.3 NMOL/L
TESTOST % FREE+WEAK BND: 11.9 %
TESTOST FREE+WEAK BND: 23.8 NG/DL
TESTOSTERONE TOT /MS: 200 NG/DL

## 2025-05-28 ENCOUNTER — LABORATORY ENCOUNTER (OUTPATIENT)
Dept: LAB | Facility: REFERENCE LAB | Age: 60
End: 2025-05-28
Attending: FAMILY MEDICINE
Payer: COMMERCIAL

## 2025-05-28 DIAGNOSIS — E78.5 HYPERLIPIDEMIA, UNSPECIFIED HYPERLIPIDEMIA TYPE: ICD-10-CM

## 2025-05-28 LAB
ALBUMIN SERPL-MCNC: 4.6 G/DL (ref 3.2–4.8)
ALBUMIN/GLOB SERPL: 1.7 {RATIO} (ref 1–2)
ALP LIVER SERPL-CCNC: 51 U/L (ref 46–118)
ALT SERPL-CCNC: 31 U/L (ref 10–49)
ANION GAP SERPL CALC-SCNC: 8 MMOL/L (ref 0–18)
AST SERPL-CCNC: 26 U/L (ref ?–34)
BILIRUB SERPL-MCNC: 1 MG/DL (ref 0.3–1.2)
BUN BLD-MCNC: 14 MG/DL (ref 9–23)
BUN/CREAT SERPL: 15.7 (ref 10–20)
CALCIUM BLD-MCNC: 9.1 MG/DL (ref 8.7–10.4)
CHLORIDE SERPL-SCNC: 107 MMOL/L (ref 98–112)
CHOLEST SERPL-MCNC: 134 MG/DL (ref ?–200)
CO2 SERPL-SCNC: 26 MMOL/L (ref 21–32)
CREAT BLD-MCNC: 0.89 MG/DL (ref 0.55–1.02)
EGFRCR SERPLBLD CKD-EPI 2021: 76 ML/MIN/1.73M2 (ref 60–?)
FASTING PATIENT LIPID ANSWER: YES
FASTING STATUS PATIENT QL REPORTED: YES
GLOBULIN PLAS-MCNC: 2.7 G/DL (ref 2–3.5)
GLUCOSE BLD-MCNC: 92 MG/DL (ref 70–99)
HDLC SERPL-MCNC: 51 MG/DL (ref 40–59)
LDLC SERPL CALC-MCNC: 67 MG/DL (ref ?–100)
NONHDLC SERPL-MCNC: 83 MG/DL (ref ?–130)
OSMOLALITY SERPL CALC.SUM OF ELEC: 292 MOSM/KG (ref 275–295)
POTASSIUM SERPL-SCNC: 4.4 MMOL/L (ref 3.5–5.1)
PROT SERPL-MCNC: 7.3 G/DL (ref 5.7–8.2)
SODIUM SERPL-SCNC: 141 MMOL/L (ref 136–145)
TRIGL SERPL-MCNC: 82 MG/DL (ref 30–149)
VLDLC SERPL CALC-MCNC: 12 MG/DL (ref 0–30)

## 2025-05-28 PROCEDURE — 80053 COMPREHEN METABOLIC PANEL: CPT | Performed by: FAMILY MEDICINE

## 2025-05-28 PROCEDURE — 80061 LIPID PANEL: CPT | Performed by: FAMILY MEDICINE

## 2025-05-28 RX ORDER — ATORVASTATIN CALCIUM 40 MG/1
40 TABLET, FILM COATED ORAL NIGHTLY
Qty: 90 TABLET | Refills: 0 | Status: SHIPPED | OUTPATIENT
Start: 2025-05-28

## 2025-07-08 ENCOUNTER — HOSPITAL ENCOUNTER (OUTPATIENT)
Dept: MAMMOGRAPHY | Age: 60
Discharge: HOME | End: 2025-07-08
Attending: FAMILY MEDICINE
Payer: COMMERCIAL

## 2025-07-08 DIAGNOSIS — Z12.31 VISIT FOR SCREENING MAMMOGRAM: ICD-10-CM

## 2025-07-08 PROCEDURE — 77063 BREAST TOMOSYNTHESIS BI: CPT | Performed by: FAMILY MEDICINE

## 2025-07-08 PROCEDURE — 77067 SCR MAMMO BI INCL CAD: CPT | Performed by: FAMILY MEDICINE

## (undated) NOTE — LETTER
11/10/21        Kevin Flores  [id number] [facility] [address]      Dear Mckenzie Almanzar records indicate that you have outstanding lab work and or testing that was ordered for you and has not yet been completed:  Orders Placed This Encounter      ABIDA HERNANDEZ